# Patient Record
Sex: MALE | Race: WHITE | NOT HISPANIC OR LATINO | URBAN - METROPOLITAN AREA
[De-identification: names, ages, dates, MRNs, and addresses within clinical notes are randomized per-mention and may not be internally consistent; named-entity substitution may affect disease eponyms.]

---

## 2017-04-18 ENCOUNTER — OFFICE VISIT (OUTPATIENT)
Dept: OTOLARYNGOLOGY | Facility: CLINIC | Age: 57
End: 2017-04-18

## 2017-04-18 DIAGNOSIS — J38.5 LARYNGEAL SPASM: Primary | ICD-10-CM

## 2017-04-18 ASSESSMENT — PAIN SCALES - GENERAL: PAINLEVEL: NO PAIN (0)

## 2017-04-18 NOTE — MR AVS SNAPSHOT
After Visit Summary   4/18/2017    Rodrigo Gann    MRN: [id number]           Patient Information     Date Of Birth          1960        Visit Information        Provider Department      4/18/2017 11:25 AM Kameron Mcarthur MD;  ENT PROCEDURE ROOM [facility]        Today's Diagnoses     Laryngeal spasm    -  1      Care Instructions    Today you have had a  Botox injection  1. Do not expect any effect for about two days  2. You might have a period of breathiness for a few days up to a week. You might have no breathiness, everyone is different  3. When the Botox starts to wear off, call the clinic to schedule another appointment. ON THE AVERAGE, that is about every 3-4 months  4. Scheduling number is [phone], option 1  5. If the botox schedule is full, ask the  to talk to Mara, the nurse who works with Dr Mcarthur  6. Dr Love normal Botox Clinics are the first Thursday of every month, in the afternoon and the third Tuesday of the month, in the morning.  7. Please call the triage RN with questions/concerns: [phone] option 2.          Follow-ups after your visit        Follow-up notes from your care team     Return if symptoms worsen or fail to improve.      Who to contact     Please call your clinic at [phone] to:    Ask questions about your health    Make or cancel appointments    Discuss your medicines    Learn about your test results    Speak to your doctor   If you have compliments or concerns about an experience at your clinic, or if you wish to file a complaint, please contact [facility] Patient Relations at [phone] or email us at [email or web address]         Additional Information About Your Visit        CS Discohart Information     [employer]alogic is an electronic gateway that provides easy, online access to your medical records. With [employer]alog[employer], you can request a clinic appointment, read your test results, renew  a prescription or communicate with your care team.     To sign up for [employer] visit the website at [email or web address]   You will be asked to enter the access code listed below, as well as some personal information. Please follow the directions to create your username and password.     Your access code is: [email or web address]  Expires: 7/3/2017  6:30 AM     Your access code will  in 90 days. If you need help or a new code, please contact your [facility] or call [phone] for assistance.        Care EveryWhere ID     This is your Care EveryWhere ID. This could be used by other organizations to access your [place] medical records  [id number]         Blood Pressure from Last 3 Encounters:   No data found for BP    Weight from Last 3 Encounters:   No data found for Wt              We Performed the Following     HC CHEMODENERVATION MUSCLE LARYNX BILAT W/EMG        Primary Care Provider Office Phone # Fax #    Donita HUANG [facility] [phone]       [place] [address]        Thank you!     Thank you for choosing [facility] TH[facility]  for your care. Our goal is always to provide you with excellent care. Hearing back from our patients is one way we can continue to improve our services. Please take a few minutes to complete the written survey that you may receive in the mail after your visit with us. Thank you!             Your Updated Medication List - Protect others around you: Learn how to safely use, store and throw away your medicines at www.disposemymeds.org.          This list is accurate as of: 17 11:59 PM.  Always use your most recent med list.                   Brand Name Dispense Instructions for use    ASPIRIN LOW DOSE 81 MG tablet   Generic drug:  aspirin      Take by mouth daily       * BOTOX 100 UNITS injection   Generic drug:  botulinum toxin type A     1.5 each    Inject 1.5 units       * BOTOX 100  UNITS injection   Generic drug:  botulinum toxin type A     1.5 each    Inject 1.5 units       LAMICTAL PO      Take 100 mg by mouth daily       LOSARTAN POTASSIUM PO      Take 25 mg by mouth daily       MELATONIN PO          OMEGA-3 FISH OIL PO          omeprazole 20 MG tablet      Take 20 mg by mouth daily       SEROQUEL PO      Take 50 mg by mouth       simvastatin 20 MG tablet    ZOCOR     Take by mouth At Bedtime       * Notice:  This list has 2 medication(s) that are the same as other medications prescribed for you. Read the directions carefully, and ask your doctor or other care provider to review them with you.

## 2017-04-18 NOTE — PROGRESS NOTES
HISTORY OF PRESENT ILLNESS:  Mr. Gann is a 56-year-old gentleman with a history of laryngeal dystonia and laryngeal spasm.  He was last injected on August 23, 2016.  We did give him #0.75 units of botulinum A toxin bilaterally.  This worked quite well for him.  He notes that he stretched it out an extra few months, but still had five to six months of a very good quality of voice.  We will continue at #0.75 units of botulinum A toxin bilaterally today.         Last 2 Scores for Patient-Answered VHI Questionnaire  VHI Total Score 4/18/2017   VHI Total Score 18         PAST MEDICAL HISTORY:   Past Medical History:   Diagnosis Date     High cholesterol      Hypertension      Reflux        PAST SURGICAL HISTORY: No past surgical history on file.    FAMILY HISTORY:   Family History   Problem Relation Age of Onset     C.A.D. Father      Hypertension Father      Hypertension Sister      CANCER Mother      CANCER Sister        SOCIAL HISTORY:   Social History   Substance Use Topics     Smoking status: Never Smoker     Smokeless tobacco: Never Used     Alcohol use Yes      Comment: 2-3 drinks a week       REVIEW OF SYSTEMS: Ten point review of systems was performed and is negative except for: No flowsheet data found.     ALLERGIES: Review of patient's allergies indicates no known allergies.    MEDICATIONS:   Current Outpatient Prescriptions   Medication Sig Dispense Refill     botulinum toxin type A (BOTOX) 100 UNITS injection Inject 1.5 units 1.5 each      botulinum toxin type A (BOTOX) 100 UNITS injection Inject 1.5 units 1.5 each      LOSARTAN POTASSIUM PO Take 25 mg by mouth daily       MELATONIN PO        LamoTRIgine (LAMICTAL PO) Take 100 mg by mouth daily       QUEtiapine Fumarate (SEROQUEL PO) Take 50 mg by mouth       Omega-3 Fatty Acids (OMEGA-3 FISH OIL PO)        simvastatin (ZOCOR) 20 MG tablet Take by mouth At Bedtime       aspirin (ASPIRIN LOW DOSE) 81 MG tablet Take by mouth daily       omeprazole 20 MG  tablet Take 20 mg by mouth daily           PHYSICAL EXAMINATION:  He  is awake, alert and in no apparent distress.    His tympanic membranes are clear and intact bilaterally. External auditory canals are clear.  Nasal exam shows a mild septal deviation without obstruction.  Examination of the oral cavity shows no suspicious lesions.  There is symmetric movement of the tongue and soft palate.    The oropharynx is clear.  His neck is supple without significant adenopathy.  Pulse is regular.  Upper airway is clear.  Cranial nerves II-XII are grossly intact.          After obtaining consent, the patient was placed in the supine position. The anterior neck was cleaned with an alcohol swab. Using a 27 gauge, unipolar electromyography needle, the larynx was entered through the cricothyroid space. #0.75 units of botulinum A toxin was injected into each thyroarytenoid muscle. There was a Strong EMG response to phonation on the Left side and a Strong EMG response to phonation on the Right side.  A total of #1.5 units was given today.  An additional 5 units of botulinum A toxin was necessarily wasted in preparation for the injection.  The patient tolerated the procedure well and left the clinic after a short observation period.       The EMG was necessary specifically in this case to identify areas of muscle overactivity as well as to access the muscle which is beneath the thyroid cartilage and is not otherwise directly accessible   without EMG guidance.      cc: Donita Churchill NP           [facility]           [address]          [place]  [address]                     Netta Avila MD           [facility]          [address]          [place] [address]                    ERICKSON/lizzy

## 2017-04-18 NOTE — LETTER
4/18/2017       RE: Rodrigo Gann  [address] [address] [address]     Dear Colleague,    Thank you for referring your patient, Rodrigo Gann, to the [facility] E[facility] TH[facility] at [facility]. Please see a copy of my visit note below.    HISTORY OF PRESENT ILLNESS:  Mr. Gann is a 56-year-old gentleman with a history of laryngeal dystonia and laryngeal spasm.  He was last injected on August 23, 2016.  We did give him #0.75 units of botulinum A toxin bilaterally.  This worked quite well for him.  He notes that he stretched it out an extra few months, but still had five to six months of a very good quality of voice.  We will continue at #0.75 units of botulinum A toxin bilaterally today.         Last 2 Scores for Patient-Answered VHI Questionnaire  VHI Total Score 4/18/2017   VHI Total Score 18         PAST MEDICAL HISTORY:   Past Medical History:   Diagnosis Date     High cholesterol      Hypertension      Reflux        PAST SURGICAL HISTORY: No past surgical history on file.    FAMILY HISTORY:   Family History   Problem Relation Age of Onset     C.A.D. Father      Hypertension Father      Hypertension Sister      CANCER Mother      CANCER Sister        SOCIAL HISTORY:   Social History   Substance Use Topics     Smoking status: Never Smoker     Smokeless tobacco: Never Used     Alcohol use Yes      Comment: 2-3 drinks a week       REVIEW OF SYSTEMS: Ten point review of systems was performed and is negative except for: No flowsheet data found.     ALLERGIES: Review of patient's allergies indicates no known allergies.    MEDICATIONS:   Current Outpatient Prescriptions   Medication Sig Dispense Refill     botulinum toxin type A (BOTOX) 100 UNITS injection Inject 1.5 units 1.5 each      botulinum toxin type A (BOTOX) 100 UNITS injection Inject 1.5 units 1.5 each      LOSARTAN POTASSIUM PO Take 25 mg by mouth daily       MELATONIN PO        LamoTRIgine (LAMICTAL PO) Take  100 mg by mouth daily       QUEtiapine Fumarate (SEROQUEL PO) Take 50 mg by mouth       Omega-3 Fatty Acids (OMEGA-3 FISH OIL PO)        simvastatin (ZOCOR) 20 MG tablet Take by mouth At Bedtime       aspirin (ASPIRIN LOW DOSE) 81 MG tablet Take by mouth daily       omeprazole 20 MG tablet Take 20 mg by mouth daily           PHYSICAL EXAMINATION:  He  is awake, alert and in no apparent distress.    His tympanic membranes are clear and intact bilaterally. External auditory canals are clear.  Nasal exam shows a mild septal deviation without obstruction.  Examination of the oral cavity shows no suspicious lesions.  There is symmetric movement of the tongue and soft palate.    The oropharynx is clear.  His neck is supple without significant adenopathy.  Pulse is regular.  Upper airway is clear.  Cranial nerves II-XII are grossly intact.          After obtaining consent, the patient was placed in the supine position. The anterior neck was cleaned with an alcohol swab. Using a 27 gauge, unipolar electromyography needle, the larynx was entered through the cricothyroid space. #0.75 units of botulinum A toxin was injected into each thyroarytenoid muscle. There was a Strong EMG response to phonation on the Left side and a Strong EMG response to phonation on the Right side.  A total of #1.5 units was given today.  An additional 5 units of botulinum A toxin was necessarily wasted in preparation for the injection.  The patient tolerated the procedure well and left the clinic after a short observation period.       The EMG was necessary specifically in this case to identify areas of muscle overactivity as well as to access the muscle which is beneath the thyroid cartilage and is not otherwise directly accessible   without EMG guidance.       Again, thank you for allowing me to participate in the care of your patient.      Sincerely,    Kameron Mcarthur MD       cc: Donita Churchill NP           [facility]            [address]          [place]  [address]                     Netta Avila MD           [facility]          [address]          [place] [address]                    ERICKSON/lizzy

## 2017-04-18 NOTE — PATIENT INSTRUCTIONS
Today you have had a  Botox injection  1. Do not expect any effect for about two days  2. You might have a period of breathiness for a few days up to a week. You might have no breathiness, everyone is different  3. When the Botox starts to wear off, call the clinic to schedule another appointment. ON THE AVERAGE, that is about every 3-4 months  4. Scheduling number is [phone], option 1  5. If the botox schedule is full, ask the  to talk to Mara, the nurse who works with Dr Mcarthur  6. Dr Love normal Botox Clinics are the first Thursday of every month, in the afternoon and the third Tuesday of the month, in the morning.  7. Please call the triage RN with questions/concerns: [phone] option 2.

## 2017-04-18 NOTE — NURSING NOTE
Chief Complaint   Patient presents with     Minor Procedure     Botox     Pt signed consent.    LIVAN Medina LPN

## 2017-04-18 NOTE — NURSING NOTE
"[facility] TH[facility]  [address] [address]  Dept: [phone]  ______________________________________________________________________________    Patient: Rodrigo Gann   : 1960   MRN: [id number]   2017    INVASIVE PROCEDURE SAFETY CHECKLIST    Date: 2017   Procedure:Botox injection  Patient Name: Rodrigo Gann  MRN: [id number]  YOB: 1960    Action: Complete sections as appropriate. Any discrepancy results in a HARD COPY until resolved.     PRE PROCEDURE:  Patient ID verified with 2 identifiers (name and  or MRN): Yes  Procedure and site verified with patient/designee (when able): Yes  Accurate consent documentation in medical record: Yes  H&P (or appropriate assessment) documented in medical record: NA  H&P must be up to 20 days prior to procedure and updates within 24 hours of procedure as applicable: {NA YES NO, EXPLAIN:[id number]::\"no  Relevant diagnostic and radiology test results appropriately labeled and displayed as applicable: no  Procedure site(s) marked with provider initials: {NA YES NO, EXPLAIN:414970:no    TIMEOUT:  Time-Out performed immediately prior to starting procedure, including verbal and active participation of all team members addressing the following:Yes  * Correct patient identify  * Confirmed that the correct side and site are marked  * An accurate procedure consent form  * Agreement on the procedure to be done  * Correct patient position  * Relevant images and results are properly labeled and appropriately displayed  * The need to administer antibiotics or fluids for irrigation purposes during the procedure as applicable   * Safety precautions based on patient history or medication use    DURING PROCEDURE: Verification of correct person, site, and procedures any time the responsibility for care of the patient is transferred to another member of the care team.               "

## 2017-07-17 DIAGNOSIS — J38.7 LARYNX DISORDER: Primary | ICD-10-CM

## 2017-07-18 ENCOUNTER — OFFICE VISIT (OUTPATIENT)
Dept: OTOLARYNGOLOGY | Facility: CLINIC | Age: 57
End: 2017-07-18

## 2017-07-18 DIAGNOSIS — J38.5 LARYNGEAL SPASM: Primary | ICD-10-CM

## 2017-07-18 ASSESSMENT — PAIN SCALES - GENERAL: PAINLEVEL: NO PAIN (0)

## 2017-07-18 NOTE — PATIENT INSTRUCTIONS
"[facility] EAR NOSE AND THROAT  [address] [address]  Dept: [phone]  ______________________________________________________________________________    Patient: Rodrigo Gann   : 1960   MRN: [id number]   2017    INVASIVE PROCEDURE SAFETY CHECKLIST    Date: 2017   Procedure:Botox injection  Patient Name: Rodrigo Gann  MRN: [id number]  YOB: 1960    Action: Complete sections as appropriate. Any discrepancy results in a HARD COPY until resolved.     PRE PROCEDURE:  Patient ID verified with 2 identifiers (name and  or MRN): Yes  Procedure and site verified with patient/designee (when able): Yes  Accurate consent documentation in medical record: Yes  H&P (or appropriate assessment) documented in medical record: NA  H&P must be up to 20 days prior to procedure and updates within 24 hours of procedure as applicable: {NA YES NO, EXPLAIN:[id number]::\"no  Relevant diagnostic and radiology test results appropriately labeled and displayed as applicable: no  Procedure site(s) marked with provider initials: {NA YES NO, EXPLAIN:627984:no    TIMEOUT:  Time-Out performed immediately prior to starting procedure, including verbal and active participation of all team members addressing the following:Yes  * Correct patient identify  * Confirmed that the correct side and site are marked  * An accurate procedure consent form  * Agreement on the procedure to be done  * Correct patient position  * Relevant images and results are properly labeled and appropriately displayed  * The need to administer antibiotics or fluids for irrigation purposes during the procedure as applicable   * Safety precautions based on patient history or medication use    DURING PROCEDURE: Verification of correct person, site, and procedures any time the responsibility for care of the patient is transferred to another member of the care team.               "

## 2017-07-18 NOTE — NURSING NOTE
Chief Complaint   Patient presents with     Minor Procedure     Botox     Pt states no pain today.  Pt signed consent.    LIVAN Medina LPN

## 2017-07-18 NOTE — PROGRESS NOTES
Rodrigo Gann is a 57 year old male with a history of adductor laryngeal dystonia and laryngeal spasm. he was last injected on 4/18/2017. he had a good response to therapy. The last dose given was 0.75 units into each thyroarytenoid muscle.  After the injection he had a breathy dysphonia for 1 week. There was  neither Dysphagia nor Dyspnea. And the response lasted for 3 months. Our plan is to give 0.75 units of Botulinum A toxin into each thyroarytenoid muscle today.      PAST MEDICAL HISTORY:   Past Medical History:   Diagnosis Date     High cholesterol      Hypertension      Reflux        PAST SURGICAL HISTORY: No past surgical history on file.    FAMILY HISTORY:   Family History   Problem Relation Age of Onset     C.A.D. Father      Hypertension Father      Hypertension Sister      CANCER Mother      CANCER Sister        SOCIAL HISTORY:   Social History   Substance Use Topics     Smoking status: Never Smoker     Smokeless tobacco: Never Used     Alcohol use Yes      Comment: 2-3 drinks a week       REVIEW OF SYSTEMS: Ten point review of systems was performed and is negative except for what is noted in the HPI and PMH.     ALLERGIES: Review of patient's allergies indicates no known allergies.    MEDICATIONS:   Current Outpatient Prescriptions   Medication Sig Dispense Refill     botulinum toxin type A (BOTOX) 100 UNITS injection Inject 1.5 units 1.5 each      botulinum toxin type A (BOTOX) 100 UNITS injection Inject 1.5 units 1.5 each      botulinum toxin type A (BOTOX) 100 UNITS injection Inject 1.5 units 1.5 each      LOSARTAN POTASSIUM PO Take 25 mg by mouth daily       MELATONIN PO        LamoTRIgine (LAMICTAL PO) Take 100 mg by mouth daily       QUEtiapine Fumarate (SEROQUEL PO) Take 50 mg by mouth       Omega-3 Fatty Acids (OMEGA-3 FISH OIL PO)        simvastatin (ZOCOR) 20 MG tablet Take by mouth At Bedtime       aspirin (ASPIRIN LOW DOSE) 81 MG tablet Take by mouth daily       omeprazole 20 MG tablet Take  20 mg by mouth daily         PHYSICAL EXAMINATION:  He  is awake, alert and in no apparent distress.    His tympanic membranes are clear and intact bilaterally. External auditory canals are clear.  Nasal exam shows a mild septal deviation without obstruction.  Examination of the oral cavity shows no suspicious lesions.  There is symmetric movement of the tongue and soft palate.    The oropharynx is clear.  His neck is supple without significant adenopathy.  Pulse is regular.  Upper airway is clear.  Cranial nerves II-XII are grossly intact.             PROCEDURE: After obtaining consent, the patient was placed in the supine position. Ground and reference electrodes were applied. The anterior neck was cleaned with an alcohol swab.  Using a 27-gauge, unipolar electromyography needle, the larynx was entered through the cricothyroid space.  0.75 units of botulinum A toxin was injected into each thyroarytenoid muscle.  There was a Moderate EMG response to phonation on the left side and a Moderate EMG response to phonation on the right side.  The total amount of botulinum A toxin delivered today was 1.5 units. An additional 5 units of botulinum A toxin was necessarily wasted in preparation for the injection. he tolerated the procedure well and left the clinic after a short observation period.       The EMG was necessary specifically in this case to identify areas of muscle overactivity as well as to access the muscle which is beneath the thyroid cartilage and is not otherwise directly accessible without EMG guidance.    PLAN: I will have him follow up on a PRN basis.    cc:                  Donita Churchill NP           [facility]           [address]          [place]  [address]               Netta Avila MD           [facility]          [address]          [place] [address]

## 2017-07-18 NOTE — MR AVS SNAPSHOT
"              After Visit Summary   2017    Rodrigo Gann    MRN: [id number]           Patient Information     Date Of Birth          1960        Visit Information        Provider Department      2017 10:05 AM Kameron Mcarthur MD [facility]        Today's Diagnoses     Laryngeal spasm    -  1      Care Instructions    [facility]  [address] [address]  Dept: [phone]  ______________________________________________________________________________    Patient: Rodrigo Gann   : 1960   MRN: [id number]   2017    INVASIVE PROCEDURE SAFETY CHECKLIST    Date: 2017   Procedure:Botox injection  Patient Name: Rodrigo Gann  MRN: [id number]  YOB: 1960    Action: Complete sections as appropriate. Any discrepancy results in a HARD COPY until resolved.     PRE PROCEDURE:  Patient ID verified with 2 identifiers (name and  or MRN): Yes  Procedure and site verified with patient/designee (when able): Yes  Accurate consent documentation in medical record: Yes  H&P (or appropriate assessment) documented in medical record: NA  H&P must be up to 20 days prior to procedure and updates within 24 hours of procedure as applicable: {NA YES NO, EXPLAIN:826453::\"no  Relevant diagnostic and radiology test results appropriately labeled and displayed as applicable: no  Procedure site(s) marked with provider initials: {NA YES NO, EXPLAIN:626620:no    TIMEOUT:  Time-Out performed immediately prior to starting procedure, including verbal and active participation of all team members addressing the following:Yes  * Correct patient identify  * Confirmed that the correct side and site are marked  * An accurate procedure consent form  * Agreement on the procedure to be done  * Correct patient position  * Relevant images and results are properly labeled and appropriately displayed  * The need to administer antibiotics or " fluids for irrigation purposes during the procedure as applicable   * Safety precautions based on patient history or medication use    DURING PROCEDURE: Verification of correct person, site, and procedures any time the responsibility for care of the patient is transferred to another member of the care team.                       Follow-ups after your visit        Follow-up notes from your care team     Return if symptoms worsen or fail to improve.      Who to contact     Please call your clinic at [phone] to:    Ask questions about your health    Make or cancel appointments    Discuss your medicines    Learn about your test results    Speak to your doctor   If you have compliments or concerns about an experience at your clinic, or if you wish to file a complaint, please contact [facility] Patient Relations at [phone] or email us at [email or web address]         Additional Information About Your Visit        [employer] Information     [employer] is an electronic gateway that provides easy, online access to your medical records. With [employer], you can request a clinic appointment, read your test results, renew a prescription or communicate with your care team.     To sign up for [employer] visit the website at [email or web address]   You will be asked to enter the access code listed below, as well as some personal information. Please follow the directions to create your username and password.     Your access code is: [email or web address]  Expires: 10/15/2017  6:31 AM     Your access code will  in 90 days. If you need help or a new code, please contact your [facility] or call [phone] for assistance.        Care EveryWhere ID     This is your Care EveryWhere ID. This could be used by other organizations to access your [facility] medical records  [id number]         Blood Pressure from Last 3 Encounters:   No data found for BP    Weight from Last  3 Encounters:   No data found for Wt              We Performed the Following     HC CHEMODENERVATION MUSCLE LARYNX BILAT W/EMG        Primary Care Provider Office Phone # Fax #    Donita Elizabethund [phone]       [facility] [address]  [facility] [address]        Equal Access to Services     SAROJ MAGDALENO : Hadreed mcfadden hadasho Soomaali, waaxda luqadaha, qaybta kaalmada adeegyada, waxay dinorah haygiulianan brandon johnsdarifransisca cullen. So [facility] [phone].    ATENCIÓN: Si habla español, tiene a aguirre disposición servicios gratuitos de asistencia lingüística. Llame al [phone].    We comply with applicable federal civil rights laws and [place] laws. We do not discriminate on the basis of race, color, national origin, age, disability sex, sexual orientation or gender identity.            Thank you!     Thank you for choosing [facility]  for your care. Our goal is always to provide you with excellent care. Hearing back from our patients is one way we can continue to improve our services. Please take a few minutes to complete the written survey that you may receive in the mail after your visit with us. Thank you!             Your Updated Medication List - Protect others around you: Learn how to safely use, store and throw away your medicines at www.disposemymeds.org.          This list is accurate as of: 7/18/17 10:34 AM.  Always use your most recent med list.                   Brand Name Dispense Instructions for use Diagnosis    ASPIRIN LOW DOSE 81 MG tablet   Generic drug:  aspirin      Take by mouth daily        * BOTOX 100 UNITS injection   Generic drug:  botulinum toxin type A     1.5 each    Inject 1.5 units    Larynx disorder       * BOTOX 100 UNITS injection   Generic drug:  botulinum toxin type A     1.5 each    Inject 1.5 units    Laryngeal spasm       * BOTOX 100 UNITS injection   Generic drug:  botulinum toxin type A     1.5 each    Inject 1.5 units    Larynx  disorder       LAMICTAL PO      Take 100 mg by mouth daily        LOSARTAN POTASSIUM PO      Take 25 mg by mouth daily        MELATONIN PO           OMEGA-3 FISH OIL PO           omeprazole 20 MG tablet      Take 20 mg by mouth daily        SEROQUEL PO      Take 50 mg by mouth        simvastatin 20 MG tablet    ZOCOR     Take by mouth At Bedtime        * Notice:  This list has 3 medication(s) that are the same as other medications prescribed for you. Read the directions carefully, and ask your doctor or other care provider to review them with you.

## 2017-07-18 NOTE — LETTER
7/18/2017       RE: Rodrigo Gann  [address] [address] [address]     Dear Colleague,    Thank you for referring your patient, Rodrigo Gann, to the [facility] at [facility]. Please see a copy of my visit note below.      Rodrigo Gann is a 57 year old male with a history of adductor laryngeal dystonia and laryngeal spasm. he was last injected on 4/18/2017. he had a good response to therapy. The last dose given was 0.75 units into each thyroarytenoid muscle.  After the injection he had a breathy dysphonia for 1 week. There was  neither Dysphagia nor Dyspnea. And the response lasted for 3 months. Our plan is to give 0.75 units of Botulinum A toxin into each thyroarytenoid muscle today.      PAST MEDICAL HISTORY:   Past Medical History:   Diagnosis Date     High cholesterol      Hypertension      Reflux        PAST SURGICAL HISTORY: No past surgical history on file.    FAMILY HISTORY:   Family History   Problem Relation Age of Onset     C.A.D. Father      Hypertension Father      Hypertension Sister      CANCER Mother      CANCER Sister        SOCIAL HISTORY:   Social History   Substance Use Topics     Smoking status: Never Smoker     Smokeless tobacco: Never Used     Alcohol use Yes      Comment: 2-3 drinks a week       REVIEW OF SYSTEMS: Ten point review of systems was performed and is negative except for what is noted in the HPI and PMH.     ALLERGIES: Review of patient's allergies indicates no known allergies.    MEDICATIONS:   Current Outpatient Prescriptions   Medication Sig Dispense Refill     botulinum toxin type A (BOTOX) 100 UNITS injection Inject 1.5 units 1.5 each      botulinum toxin type A (BOTOX) 100 UNITS injection Inject 1.5 units 1.5 each      botulinum toxin type A (BOTOX) 100 UNITS injection Inject 1.5 units 1.5 each      LOSARTAN POTASSIUM PO Take 25 mg by mouth daily       MELATONIN PO        LamoTRIgine (LAMICTAL PO) Take 100 mg by  mouth daily       QUEtiapine Fumarate (SEROQUEL PO) Take 50 mg by mouth       Omega-3 Fatty Acids (OMEGA-3 FISH OIL PO)        simvastatin (ZOCOR) 20 MG tablet Take by mouth At Bedtime       aspirin (ASPIRIN LOW DOSE) 81 MG tablet Take by mouth daily       omeprazole 20 MG tablet Take 20 mg by mouth daily         PHYSICAL EXAMINATION:  He  is awake, alert and in no apparent distress.    His tympanic membranes are clear and intact bilaterally. External auditory canals are clear.  Nasal exam shows a mild septal deviation without obstruction.  Examination of the oral cavity shows no suspicious lesions.  There is symmetric movement of the tongue and soft palate.    The oropharynx is clear.  His neck is supple without significant adenopathy.  Pulse is regular.  Upper airway is clear.  Cranial nerves II-XII are grossly intact.             PROCEDURE: After obtaining consent, the patient was placed in the supine position. Ground and reference electrodes were applied. The anterior neck was cleaned with an alcohol swab.  Using a 27-gauge, unipolar electromyography needle, the larynx was entered through the cricothyroid space.  0.75 units of botulinum A toxin was injected into each thyroarytenoid muscle.  There was a Moderate EMG response to phonation on the left side and a Moderate EMG response to phonation on the right side.  The total amount of botulinum A toxin delivered today was 1.5 units. An additional 5 units of botulinum A toxin was necessarily wasted in preparation for the injection. he tolerated the procedure well and left the clinic after a short observation period.       The EMG was necessary specifically in this case to identify areas of muscle overactivity as well as to access the muscle which is beneath the thyroid cartilage and is not otherwise directly accessible without EMG guidance.    PLAN: I will have him follow up on a PRN basis.      Again, thank you for allowing me to participate in the care of your  patient.      Sincerely,    Kameron Mcarthur MD        cc:   Donita Churchill NP           [facility]           [address], [address]          [place]  [address]               Netta Avila MD           [facility]          [address]          [place] [address]

## 2017-11-02 ENCOUNTER — OFFICE VISIT (OUTPATIENT)
Dept: OTOLARYNGOLOGY | Facility: CLINIC | Age: 57
End: 2017-11-02

## 2017-11-02 DIAGNOSIS — J38.5 LARYNGEAL SPASM: Primary | ICD-10-CM

## 2017-11-02 ASSESSMENT — PAIN SCALES - GENERAL: PAINLEVEL: NO PAIN (0)

## 2017-11-02 NOTE — PROGRESS NOTES
Rodrigo Gann is a 57 year old male with a history of adductor laryngeal dystonia and laryngeal spasm.  he was last injected on 7/18/2017. he had a good response to the last injection. The last dose given was 0.75 units into each thyroarytenoid muscle.  After the injection  he had a breathy dysphonia for 0 weeks.There was no Dysphagia or Dyspnea.  The response lasted for 4 months.   Our plan is to give 0.75 units of Botulinum A toxin into  each thyroarytenoid muscle today.      PAST MEDICAL HISTORY:   Past Medical History:   Diagnosis Date     High cholesterol      Hypertension      Reflux        PAST SURGICAL HISTORY: No past surgical history on file.    FAMILY HISTORY:   Family History   Problem Relation Age of Onset     C.A.D. Father      Hypertension Father      Hypertension Sister      CANCER Mother      CANCER Sister        SOCIAL HISTORY:   Social History   Substance Use Topics     Smoking status: Never Smoker     Smokeless tobacco: Never Used     Alcohol use Yes      Comment: 2-3 drinks a week       REVIEW OF SYSTEMS: Ten point review of systems was performed and is negative except for what is noted in the HPI and PMH.     ALLERGIES: Review of patient's allergies indicates no known allergies.    MEDICATIONS:   Current Outpatient Prescriptions   Medication Sig Dispense Refill     botulinum toxin type A (BOTOX) 100 UNITS injection Inject 1.5 units 1.5 each      botulinum toxin type A (BOTOX) 100 UNITS injection Inject 1.5 units 1.5 each      botulinum toxin type A (BOTOX) 100 UNITS injection Inject 1.5 units 1.5 each      LOSARTAN POTASSIUM PO Take 25 mg by mouth daily       MELATONIN PO        LamoTRIgine (LAMICTAL PO) Take 100 mg by mouth daily       QUEtiapine Fumarate (SEROQUEL PO) Take 50 mg by mouth       Omega-3 Fatty Acids (OMEGA-3 FISH OIL PO)        simvastatin (ZOCOR) 20 MG tablet Take by mouth At Bedtime       aspirin (ASPIRIN LOW DOSE) 81 MG tablet Take by mouth daily       omeprazole 20 MG  tablet Take 20 mg by mouth daily         PHYSICAL EXAMINATION:  He  is awake, alert and in no apparent distress.    His tympanic membranes are clear and intact bilaterally. External auditory canals are clear.  Nasal exam shows a mild septal deviation without obstruction.  Examination of the oral cavity shows no suspicious lesions.  There is symmetric movement of the tongue and soft palate.    The oropharynx is clear.  His neck is supple without significant adenopathy.  Pulse is regular.  Upper airway is clear.  Cranial nerves II-XII are grossly intact.         PROCEDURE: After obtaining consent, the patient was placed in the supine position. Ground and reference electrodes were applied. The anterior neck was cleaned with an alcohol swab.  Using a 27-gauge, unipolar electromyography needle, the larynx was entered through the cricothyroid space.  0.75 units of botulinum A toxin was injected into each thyroarytenoid muscle.  There was a Strong EMG response to phonation on the left side and a Strong EMG response to phonation on the right side.  The total amount of botulinum A toxin delivered today was 1.5 units. An additional 5 units of botulinum A toxin was necessarily wasted in preparation for the injection. he tolerated the procedure well and left the clinic after a short observation period.         The EMG was necessary specifically in this case to identify areas of muscle overactivity as well as to access the thyroarytenoid muscle which is beneath the thyroid cartilage and is not otherwise directly accessible without EMG guidance.    PLAN: I will have him  follow up on a PRN basis.

## 2017-11-02 NOTE — MR AVS SNAPSHOT
After Visit Summary   11/2/2017    Rodrigo Gann    MRN: [id number]           Patient Information     Date Of Birth          1960        Visit Information        Provider Department      11/2/2017 3:00 PM Kameron Mcarthur MD [facility]        Today's Diagnoses     Laryngeal spasm    -  1      Care Instructions    Today you have had a  Botox injection  1. Do not expect any effect for about two days  2. You might have a period of breathiness for a few days up to a week. You might have no breathiness, everyone is different  3. When the Botox starts to wear off, call the clinic to schedule another appointment. ON THE AVERAGE, that is about every 3-4 months  4. Scheduling number is [phone], option 1  5. If the botox schedule is full, ask the  to talk to Mara, the nurse who works with Dr Mcarthur  6. Dr Love normal Botox Clinics are the first Thursday of every month, in the afternoon and the third Tuesday of the month, in the morning.  7. Please call the triage RN with questions/concerns: [phone] option 2.            Follow-ups after your visit        Follow-up notes from your care team     Return if symptoms worsen or fail to improve.      Who to contact     Please call your clinic at [phone] to:    Ask questions about your health    Make or cancel appointments    Discuss your medicines    Learn about your test results    Speak to your doctor   If you have compliments or concerns about an experience at your clinic, or if you wish to file a complaint, please contact [facility] Patient Relations at [phone] or email us at [email or web address]         Additional Information About Your Visit        MyChart Information     [employer] is an electronic gateway that provides easy, online access to your medical records. With [employer], you can request a clinic appointment, read your test results, renew a prescription or  communicate with your care team.     To sign up for [employer] visit the website at [email or web address]   You will be asked to enter the access code listed below, as well as some personal information. Please follow the directions to create your username and password.     Your access code is: [email or web address]  Expires: 2018  6:30 AM     Your access code will  in 90 days. If you need help or a new code, please contact your [facility] or call [phone] for assistance.        Care EveryWhere ID     This is your Care EveryWhere ID. This could be used by other organizations to access your [facility] medical records  [id number]         Blood Pressure from Last 3 Encounters:   No data found for BP    Weight from Last 3 Encounters:   No data found for Wt              We Performed the Following     HC CHEMODENERVATION MUSCLE LARYNX BILAT W/EMG        Primary Care Provider Office Phone # Fax #    Donita HUANG [facility] [phone]       [address] [place] [address]        Equal Access to Services     [facility]: Hadii aad ku hadasho Soomaali, waaxda luqadaha, qaybta kaalmada adeegyada, waxay dinorah haycandice jordan . So [facility] [phone].    ATENCIÓN: Si habla español, tiene a aguirre disposición servicios gratuitos de asistencia lingüística. Llame al [phone].    We comply with applicable federal civil rights laws and [place] laws. We do not discriminate on the basis of race, color, national origin, age, disability, sex, sexual orientation, or gender identity.            Thank you!     Thank you for choosing [facility] TH[facility]AT  for your care. Our goal is always to provide you with excellent care. Hearing back from our patients is one way we can continue to improve our services. Please take a few minutes to complete the written survey that you may receive in the mail after your visit with us. Thank you!             Your  Updated Medication List - Protect others around you: Learn how to safely use, store and throw away your medicines at www.disposemymeds.org.          This list is accurate as of: 11/2/17 11:59 PM.  Always use your most recent med list.                   Brand Name Dispense Instructions for use Diagnosis    ASPIRIN LOW DOSE 81 MG tablet   Generic drug:  aspirin      Take by mouth daily        * BOTOX 100 UNITS injection   Generic drug:  botulinum toxin type A     1.5 each    Inject 1.5 units    Larynx disorder       * BOTOX 100 UNITS injection   Generic drug:  botulinum toxin type A     1.5 each    Inject 1.5 units    Laryngeal spasm       * BOTOX 100 UNITS injection   Generic drug:  botulinum toxin type A     1.5 each    Inject 1.5 units    Larynx disorder       LAMICTAL PO      Take 100 mg by mouth daily        LOSARTAN POTASSIUM PO      Take 25 mg by mouth daily        MELATONIN PO           OMEGA-3 FISH OIL PO           omeprazole 20 MG tablet      Take 20 mg by mouth daily        SEROQUEL PO      Take 50 mg by mouth        simvastatin 20 MG tablet    ZOCOR     Take by mouth At Bedtime        * Notice:  This list has 3 medication(s) that are the same as other medications prescribed for you. Read the directions carefully, and ask your doctor or other care provider to review them with you.

## 2017-11-02 NOTE — PATIENT INSTRUCTIONS
Today you have had a  Botox injection  1. Do not expect any effect for about two days  2. You might have a period of breathiness for a few days up to a week. You might have no breathiness, everyone is different  3. When the Botox starts to wear off, call the clinic to schedule another appointment. ON THE AVERAGE, that is about every 3-4 months  4. Scheduling number is [phone], option 1  5. If the botox schedule is full, ask the  to talk to Mara, the nurse who works with Dr Mcarthur  6. Dr Love normal Botox Clinics are the first Thursday of every month, in the afternoon and the third Tuesday of the month, in the morning.  7. Please call the triage RN with questions/concerns: [phone] option 2.

## 2017-11-02 NOTE — LETTER
11/2/2017       RE: Rodrigo Gann  [address] [facility] [address]     Dear Colleague,    Thank you for referring your patient, Rodrigo Gann, to the [facility] EAR NOSE AND THROAT at [facility]. Please see a copy of my visit note below.        Rodrigo Gann is a 57 year old male with a history of adductor laryngeal dystonia and laryngeal spasm.  he was last injected on 7/18/2017. he had a good response to the last injection. The last dose given was 0.75 units into each thyroarytenoid muscle.  After the injection  he had a breathy dysphonia for 0 weeks.There was no Dysphagia or Dyspnea.  The response lasted for 4 months.   Our plan is to give 0.75 units of Botulinum A toxin into  each thyroarytenoid muscle today.      PAST MEDICAL HISTORY:   Past Medical History:   Diagnosis Date     High cholesterol      Hypertension      Reflux        PAST SURGICAL HISTORY: No past surgical history on file.    FAMILY HISTORY:   Family History   Problem Relation Age of Onset     C.A.D. Father      Hypertension Father      Hypertension Sister      CANCER Mother      CANCER Sister        SOCIAL HISTORY:   Social History   Substance Use Topics     Smoking status: Never Smoker     Smokeless tobacco: Never Used     Alcohol use Yes      Comment: 2-3 drinks a week       REVIEW OF SYSTEMS: Ten point review of systems was performed and is negative except for what is noted in the HPI and PMH.     ALLERGIES: Review of patient's allergies indicates no known allergies.    MEDICATIONS:   Current Outpatient Prescriptions   Medication Sig Dispense Refill     botulinum toxin type A (BOTOX) 100 UNITS injection Inject 1.5 units 1.5 each      botulinum toxin type A (BOTOX) 100 UNITS injection Inject 1.5 units 1.5 each      botulinum toxin type A (BOTOX) 100 UNITS injection Inject 1.5 units 1.5 each      LOSARTAN POTASSIUM PO Take 25 mg by mouth daily       MELATONIN PO        LamoTRIgine (LAMICTAL PO) Take 100  mg by mouth daily       QUEtiapine Fumarate (SEROQUEL PO) Take 50 mg by mouth       Omega-3 Fatty Acids (OMEGA-3 FISH OIL PO)        simvastatin (ZOCOR) 20 MG tablet Take by mouth At Bedtime       aspirin (ASPIRIN LOW DOSE) 81 MG tablet Take by mouth daily       omeprazole 20 MG tablet Take 20 mg by mouth daily         PHYSICAL EXAMINATION:  He  is awake, alert and in no apparent distress.    His tympanic membranes are clear and intact bilaterally. External auditory canals are clear.  Nasal exam shows a mild septal deviation without obstruction.  Examination of the oral cavity shows no suspicious lesions.  There is symmetric movement of the tongue and soft palate.    The oropharynx is clear.  His neck is supple without significant adenopathy.  Pulse is regular.  Upper airway is clear.  Cranial nerves II-XII are grossly intact.         PROCEDURE: After obtaining consent, the patient was placed in the supine position. Ground and reference electrodes were applied. The anterior neck was cleaned with an alcohol swab.  Using a 27-gauge, unipolar electromyography needle, the larynx was entered through the cricothyroid space.  0.75 units of botulinum A toxin was injected into each thyroarytenoid muscle.  There was a Strong EMG response to phonation on the left side and a Strong EMG response to phonation on the right side.  The total amount of botulinum A toxin delivered today was 1.5 units. An additional 5 units of botulinum A toxin was necessarily wasted in preparation for the injection. he tolerated the procedure well and left the clinic after a short observation period.         The EMG was necessary specifically in this case to identify areas of muscle overactivity as well as to access the thyroarytenoid muscle which is beneath the thyroid cartilage and is not otherwise directly accessible without EMG guidance.    PLAN: I will have him  follow up on a PRN basis.        =  Again, thank you for allowing me to participate  in the care of your patient.      Sincerely,    Kameron Mcarthur MD

## 2017-11-02 NOTE — NURSING NOTE
"[facility] EAR NOSE AND THROAT  [address] [address]  Dept: [phone]  ______________________________________________________________________________    Patient: Rodrigo Gann   : 1960   MRN: [id number]   2017    INVASIVE PROCEDURE SAFETY CHECKLIST    Date: 2017   Procedure:Botox injection  Patient Name: Rodrigo Gann  MRN: [id number]  YOB: 1960    Action: Complete sections as appropriate. Any discrepancy results in a HARD COPY until resolved.     PRE PROCEDURE:  Patient ID verified with 2 identifiers (name and  or MRN): Yes  Procedure and site verified with patient/designee (when able): Yes  Accurate consent documentation in medical record: Yes  H&P (or appropriate assessment) documented in medical record: NA  H&P must be up to 20 days prior to procedure and updates within 24 hours of procedure as applicable: {NA YES NO, EXPLAIN:036897::\"no  Relevant diagnostic and radiology test results appropriately labeled and displayed as applicable: no  Procedure site(s) marked with provider initials: {NA YES NO, EXPLAIN:[id number]:no    TIMEOUT:  Time-Out performed immediately prior to starting procedure, including verbal and active participation of all team members addressing the following:Yes  * Correct patient identify  * Confirmed that the correct side and site are marked  * An accurate procedure consent form  * Agreement on the procedure to be done  * Correct patient position  * Relevant images and results are properly labeled and appropriately displayed  * The need to administer antibiotics or fluids for irrigation purposes during the procedure as applicable   * Safety precautions based on patient history or medication use    DURING PROCEDURE: Verification of correct person, site, and procedures any time the responsibility for care of the patient is transferred to another member of the care team.               "

## 2018-02-20 ENCOUNTER — OFFICE VISIT (OUTPATIENT)
Dept: OTOLARYNGOLOGY | Facility: CLINIC | Age: 58
End: 2018-02-20
Payer: COMMERCIAL

## 2018-02-20 DIAGNOSIS — J38.5 LARYNGEAL SPASM: Primary | ICD-10-CM

## 2018-02-20 RX ORDER — UBIDECARENONE 100 MG
200 CAPSULE ORAL DAILY
COMMUNITY

## 2018-02-20 RX ORDER — ACETAMINOPHEN 160 MG
1 TABLET,DISINTEGRATING ORAL DAILY
COMMUNITY

## 2018-02-20 ASSESSMENT — PAIN SCALES - GENERAL: PAINLEVEL: NO PAIN (0)

## 2018-02-20 NOTE — PROGRESS NOTES
Rodrigo Gann is a 57 year old male with a history of adductor laryngeal dystonia and laryngeal spasm.  he was last injected on 11/2/2017. he had a good response to the last injection. The last dose given was 0.75 units into each thyroarytenoid muscle.  After the injection  he had a breathy dysphonia for 0 weeks.There was no Dysphagia or Dyspnea.  The response lasted for 3 months.   Our plan is to give 0.75 units of Botulinum A toxin into  each thyroarytenoid muscle today.        PAST MEDICAL HISTORY:   Past Medical History:   Diagnosis Date     High cholesterol      Hypertension      Reflux        PAST SURGICAL HISTORY: No past surgical history on file.    FAMILY HISTORY:   Family History   Problem Relation Age of Onset     C.A.D. Father      Hypertension Father      Hypertension Sister      CANCER Mother      CANCER Sister        SOCIAL HISTORY:   Social History   Substance Use Topics     Smoking status: Never Smoker     Smokeless tobacco: Never Used     Alcohol use Yes      Comment: 2-3 drinks a week       REVIEW OF SYSTEMS: Ten point review of systems was performed and is negative except for what is noted in the HPI and PMH.     ALLERGIES: Review of patient's allergies indicates no known allergies.    MEDICATIONS:   Current Outpatient Prescriptions   Medication Sig Dispense Refill     botulinum toxin type A (BOTOX) 100 UNITS injection Inject 1.5 units 1.5 each      botulinum toxin type A (BOTOX) 100 UNITS injection Inject 1.5 units 1.5 each      botulinum toxin type A (BOTOX) 100 UNITS injection Inject 1.5 units 1.5 each      LOSARTAN POTASSIUM PO Take 25 mg by mouth daily       MELATONIN PO        LamoTRIgine (LAMICTAL PO) Take 100 mg by mouth daily       QUEtiapine Fumarate (SEROQUEL PO) Take 50 mg by mouth       Omega-3 Fatty Acids (OMEGA-3 FISH OIL PO)        simvastatin (ZOCOR) 20 MG tablet Take by mouth At Bedtime       aspirin (ASPIRIN LOW DOSE) 81 MG tablet Take by mouth daily       omeprazole 20 MG  tablet Take 20 mg by mouth daily         PHYSICAL EXAMINATION:  He  is awake, alert and in no apparent distress.    His tympanic membranes are clear and intact bilaterally. External auditory canals are clear.  Nasal exam shows a mild septal deviation without obstruction.  Examination of the oral cavity shows no suspicious lesions.  There is symmetric movement of the tongue and soft palate.    The oropharynx is clear.  His neck is supple without significant adenopathy.  Pulse is regular.  Upper airway is clear.  Cranial nerves II-XII are grossly intact.         PROCEDURE: After obtaining consent, the patient was placed in the supine position. Ground and reference electrodes were applied. The anterior neck was cleaned with an alcohol swab.  Using a 27-gauge, unipolar electromyography needle, the larynx was entered through the cricothyroid space.  0.75 units of botulinum A toxin was injected into each thyroarytenoid muscle.  There was a Strong EMG response to phonation on the left side and a Strong EMG response to phonation on the right side.  The total amount of botulinum A toxin delivered today was 1.5 units. An additional 5 units of botulinum A toxin was necessarily wasted in preparation for the injection. he tolerated the procedure well and left the clinic after a short observation period.         The EMG was necessary specifically in this case to identify areas of muscle overactivity as well as to access the thyroarytenoid muscle which is beneath the thyroid cartilage and is not otherwise directly accessible without EMG guidance.    PLAN: I will have him  follow up on a PRN basis.

## 2018-02-20 NOTE — LETTER
2/20/2018       RE: Rodrigo Gann  [address] [facility] [address]     Dear Colleague,    Thank you for referring your patient, Rodrigo Gann, to the [facility] EAR NOSE AND THROAT at [facility]. Please see a copy of my visit note below.      Rodrigo Gann is a 57 year old male with a history of adductor laryngeal dystonia and laryngeal spasm.  he was last injected on 11/2/2017. he had a good response to the last injection. The last dose given was 0.75 units into each thyroarytenoid muscle.  After the injection  he had a breathy dysphonia for 0 weeks.There was no Dysphagia or Dyspnea.  The response lasted for 3 months.   Our plan is to give 0.75 units of Botulinum A toxin into  each thyroarytenoid muscle today.        PAST MEDICAL HISTORY:   Past Medical History:   Diagnosis Date     High cholesterol      Hypertension      Reflux        PAST SURGICAL HISTORY: No past surgical history on file.    FAMILY HISTORY:   Family History   Problem Relation Age of Onset     C.A.D. Father      Hypertension Father      Hypertension Sister      CANCER Mother      CANCER Sister        SOCIAL HISTORY:   Social History   Substance Use Topics     Smoking status: Never Smoker     Smokeless tobacco: Never Used     Alcohol use Yes      Comment: 2-3 drinks a week       REVIEW OF SYSTEMS: Ten point review of systems was performed and is negative except for what is noted in the HPI and PMH.     ALLERGIES: Review of patient's allergies indicates no known allergies.    MEDICATIONS:   Current Outpatient Prescriptions   Medication Sig Dispense Refill     botulinum toxin type A (BOTOX) 100 UNITS injection Inject 1.5 units 1.5 each      botulinum toxin type A (BOTOX) 100 UNITS injection Inject 1.5 units 1.5 each      botulinum toxin type A (BOTOX) 100 UNITS injection Inject 1.5 units 1.5 each      LOSARTAN POTASSIUM PO Take 25 mg by mouth daily       MELATONIN PO        LamoTRIgine (LAMICTAL PO) Take 100  mg by mouth daily       QUEtiapine Fumarate (SEROQUEL PO) Take 50 mg by mouth       Omega-3 Fatty Acids (OMEGA-3 FISH OIL PO)        simvastatin (ZOCOR) 20 MG tablet Take by mouth At Bedtime       aspirin (ASPIRIN LOW DOSE) 81 MG tablet Take by mouth daily       omeprazole 20 MG tablet Take 20 mg by mouth daily         PHYSICAL EXAMINATION:  He  is awake, alert and in no apparent distress.    His tympanic membranes are clear and intact bilaterally. External auditory canals are clear.  Nasal exam shows a mild septal deviation without obstruction.  Examination of the oral cavity shows no suspicious lesions.  There is symmetric movement of the tongue and soft palate.    The oropharynx is clear.  His neck is supple without significant adenopathy.  Pulse is regular.  Upper airway is clear.  Cranial nerves II-XII are grossly intact.         PROCEDURE: After obtaining consent, the patient was placed in the supine position. Ground and reference electrodes were applied. The anterior neck was cleaned with an alcohol swab.  Using a 27-gauge, unipolar electromyography needle, the larynx was entered through the cricothyroid space.  0.75 units of botulinum A toxin was injected into each thyroarytenoid muscle.  There was a Strong EMG response to phonation on the left side and a Strong EMG response to phonation on the right side.  The total amount of botulinum A toxin delivered today was 1.5 units. An additional 5 units of botulinum A toxin was necessarily wasted in preparation for the injection. he tolerated the procedure well and left the clinic after a short observation period.         The EMG was necessary specifically in this case to identify areas of muscle overactivity as well as to access the thyroarytenoid muscle which is beneath the thyroid cartilage and is not otherwise directly accessible without EMG guidance.    PLAN: I will have him  follow up on a PRN basis.          Again, thank you for allowing me to participate in  the care of your patient.      Sincerely,    Kameron Mcarthur MD

## 2018-02-20 NOTE — PATIENT INSTRUCTIONS
"[facility] EAR NOSE AND THROAT  [address] [address]  Dept: [phone]  ______________________________________________________________________________    Patient: Rodrigo Gann   : 1960   MRN: [id number]   2018    INVASIVE PROCEDURE SAFETY CHECKLIST    Date: 2018   Procedure:Botox injection  Patient Name: Rodrigo Gann  MRN: [id number]  YOB: 1960    Action: Complete sections as appropriate. Any discrepancy results in a HARD COPY until resolved.     PRE PROCEDURE:  Patient ID verified with 2 identifiers (name and  or MRN): Yes  Procedure and site verified with patient/designee (when able): Yes  Accurate consent documentation in medical record: Yes  H&P (or appropriate assessment) documented in medical record: NA  H&P must be up to 20 days prior to procedure and updates within 24 hours of procedure as applicable: {NA YES NO, EXPLAIN:370888::\"no  Relevant diagnostic and radiology test results appropriately labeled and displayed as applicable: no  Procedure site(s) marked with provider initials: {NA YES NO, EXPLAIN:904800:no    TIMEOUT:  Time-Out performed immediately prior to starting procedure, including verbal and active participation of all team members addressing the following:Yes  * Correct patient identify  * Confirmed that the correct side and site are marked  * An accurate procedure consent form  * Agreement on the procedure to be done  * Correct patient position  * Relevant images and results are properly labeled and appropriately displayed  * The need to administer antibiotics or fluids for irrigation purposes during the procedure as applicable   * Safety precautions based on patient history or medication use    DURING PROCEDURE: Verification of correct person, site, and procedures any time the responsibility for care of the patient is transferred to another member of the care team.     Today you have had a  Botox injection  1. Do " not expect any effect for about two days  2. You might have a period of breathiness for a few days up to a week. You might have no breathiness, everyone is different  3. When the Botox starts to wear off, call the clinic to schedule another appointment. ON THE AVERAGE, that is about every 3-4 months  4. Scheduling number is [phone], option 1  5. If the botox schedule is full, ask the  to talk to Mara, the nurse who works with Dr Mcarthur  6. Dr Love normal Botox Clinics are the first Thursday of every month, in the afternoon and the third Tuesday of the month, in the morning.  7. Please call the triage RN with questions/concerns: [phone] option 2.

## 2018-02-20 NOTE — MR AVS SNAPSHOT
"              After Visit Summary   2018    Rodrigo Gann    MRN: [id number]           Patient Information     Date Of Birth          1960        Visit Information        Provider Department      2018 10:25 AM Kameron Mcarthur MD [facility] Ear Nose and Throat        Today's Diagnoses     Laryngeal spasm    -  1      Care Instructions    [facility] EAR NOSE AND THROAT  [address] [address]  Dept: [phone]  ______________________________________________________________________________    Patient: Rodrigo Gann   : 1960   MRN: [id number]   2018    INVASIVE PROCEDURE SAFETY CHECKLIST    Date: 2018   Procedure:Botox injection  Patient Name: Rodrigo Gann  MRN: [id number]  YOB: 1960    Action: Complete sections as appropriate. Any discrepancy results in a HARD COPY until resolved.     PRE PROCEDURE:  Patient ID verified with 2 identifiers (name and  or MRN): Yes  Procedure and site verified with patient/designee (when able): Yes  Accurate consent documentation in medical record: Yes  H&P (or appropriate assessment) documented in medical record: NA  H&P must be up to 20 days prior to procedure and updates within 24 hours of procedure as applicable: {NA YES NO, EXPLAIN:[id number]::\"no  Relevant diagnostic and radiology test results appropriately labeled and displayed as applicable: no  Procedure site(s) marked with provider initials: {NA YES NO, EXPLAIN:853283:no    TIMEOUT:  Time-Out performed immediately prior to starting procedure, including verbal and active participation of all team members addressing the following:Yes  * Correct patient identify  * Confirmed that the correct side and site are marked  * An accurate procedure consent form  * Agreement on the procedure to be done  * Correct patient position  * Relevant images and results are properly labeled and appropriately displayed  * The need to administer " antibiotics or fluids for irrigation purposes during the procedure as applicable   * Safety precautions based on patient history or medication use    DURING PROCEDURE: Verification of correct person, site, and procedures any time the responsibility for care of the patient is transferred to another member of the care team.     Today you have had a  Botox injection  1. Do not expect any effect for about two days  2. You might have a period of breathiness for a few days up to a week. You might have no breathiness, everyone is different  3. When the Botox starts to wear off, call the clinic to schedule another appointment. ON THE AVERAGE, that is about every 3-4 months  4. Scheduling number is [phone], option 1  5. If the botox schedule is full, ask the  to talk to Mara, the nurse who works with Dr Mcarthur  6. Dr Love normal Botox Clinics are the first Thursday of every month, in the afternoon and the third Tuesday of the month, in the morning.  7. Please call the triage RN with questions/concerns: [phone] option 2.                        Follow-ups after your visit        Follow-up notes from your care team     Return if symptoms worsen or fail to improve.      Who to contact     Please call your clinic at [phone] to:    Ask questions about your health    Make or cancel appointments    Discuss your medicines    Learn about your test results    Speak to your doctor            Additional Information About Your Visit        [employer] Information     [employer] is an electronic gateway that provides easy, online access to your medical records. With [employer], you can request a clinic appointment, read your test results, renew a prescription or communicate with your care team.     To sign up for [employer] visit the website at [email or web address]   You will be asked to enter the access code listed below, as well as some personal information. Please follow the directions to create your username and  password.     Your access code is: [email or web address]  Expires: 2018  6:30 AM     Your access code will  in 90 days. If you need help or a new code, please contact your [facility] or call [phone] for assistance.        Care EveryWhere ID     This is your Care EveryWhere ID. This could be used by other organizations to access your [facility] medical records  [id number]         Blood Pressure from Last 3 Encounters:   No data found for BP    Weight from Last 3 Encounters:   No data found for Wt              We Performed the Following     HC CHEMODENERVATION MUSCLE LARYNX BILAT W/EMG        Primary Care Provider Office Phone # Fax #    Donita HUANG Edlund [phone]       [address] [address]        Equal Access to Services     SAROJ MAGDALENO : Hadii aad ku hadasho Soomaali, waaxda luqadaha, qaybta kaalmada adeegyada, waxay dinorah haycandice jordan . So [facility] [phone].    ATENCIÓN: Si habla español, tiene a aguirre disposición servicios gratuitos de asistencia lingüística. William al [phone].    We comply with applicable federal civil rights laws and [place] laws. We do not discriminate on the basis of race, color, national origin, age, disability, sex, sexual orientation, or gender identity.            Thank you!     Thank you for choosing [facility] EAR NOSE AND THROAT  for your care. Our goal is always to provide you with excellent care. Hearing back from our patients is one way we can continue to improve our services. Please take a few minutes to complete the written survey that you may receive in the mail after your visit with us. Thank you!             Your Updated Medication List - Protect others around you: Learn how to safely use, store and throw away your medicines at www.disposemymeds.org.          This list is accurate as of 18 10:36 AM.  Always use your most recent med list.                   Brand Name Dispense  Instructions for use Diagnosis    ASPIRIN LOW DOSE 81 MG tablet   Generic drug:  aspirin      Take by mouth daily        * BOTOX 100 UNITS injection   Generic drug:  botulinum toxin type A     1.5 each    Inject 1.5 units    Larynx disorder       * BOTOX 100 UNITS injection   Generic drug:  botulinum toxin type A     1.5 each    Inject 1.5 units    Laryngeal spasm       * BOTOX 100 UNITS injection   Generic drug:  botulinum toxin type A     1.5 each    Inject 1.5 units    Larynx disorder       LAMICTAL PO      Take 100 mg by mouth daily        LOSARTAN POTASSIUM PO      Take 25 mg by mouth daily        MELATONIN PO           OMEGA-3 FISH OIL PO           omeprazole 20 MG tablet      Take 20 mg by mouth daily        SEROQUEL PO      Take 50 mg by mouth        simvastatin 20 MG tablet    ZOCOR     Take by mouth At Bedtime        SM COENZYME Q-10 100 MG Caps capsule   Generic drug:  co-enzyme Q-10      Take 200 mg by mouth daily        vitamin D3 2000 UNITS Caps      Take 1 tablet by mouth daily        * Notice:  This list has 3 medication(s) that are the same as other medications prescribed for you. Read the directions carefully, and ask your doctor or other care provider to review them with you.

## 2018-06-06 ENCOUNTER — EXTERNAL ORDER RESULTS (OUTPATIENT)
Dept: CARE COORDINATION | Facility: CLINIC | Age: 58
End: 2018-06-06

## 2018-06-19 ENCOUNTER — OFFICE VISIT (OUTPATIENT)
Dept: OTOLARYNGOLOGY | Facility: CLINIC | Age: 58
End: 2018-06-19
Payer: COMMERCIAL

## 2018-06-19 DIAGNOSIS — J38.3 OTHER DISEASES OF VOCAL CORDS: Primary | ICD-10-CM

## 2018-06-19 NOTE — MR AVS SNAPSHOT
After Visit Summary   6/19/2018    Rodrigo Gann    MRN: [id number]           Patient Information     Date Of Birth          1960        Visit Information        Provider Department      6/19/2018 10:35 AM Kameron Mcarthur MD [facility]        Today's Diagnoses     Other diseases of vocal cords    -  1      Care Instructions    Today you have had a  Botox injection  1. Do not expect any effect for about two days  2. You might have a period of breathiness for a few days up to a week. You might have no breathiness, everyone is different  3. When the Botox starts to wear off, call the clinic to schedule another appointment. ON THE AVERAGE, that is about every 3-4 months  4. Scheduling number is [phone], option 1  5. If the botox schedule is full, ask the  to talk to Mara, the nurse who works with Dr Mcarthur  6. Dr Love normal Botox Clinics are the first Thursday of every month, in the afternoon and the third Tuesday of the month, in the morning.  7. Please call the triage RN with questions/concerns: [phone] option 2.            Follow-ups after your visit        Follow-up notes from your care team     Return if symptoms worsen or fail to improve.      Who to contact     Please call your clinic at [phone] to:    Ask questions about your health    Make or cancel appointments    Discuss your medicines    Learn about your test results    Speak to your doctor            Additional Information About Your Visit        MyChart Information     ISVSt is an electronic gateway that provides easy, online access to your medical records. With [employer], you can request a clinic appointment, read your test results, renew a prescription or communicate with your care team.     To sign up for ISVSt visit the website at [email or web address]   You will be asked to enter the access code listed below, as well as some personal information. Please follow the  directions to create your username and password.     Your access code is: [email or web address]  Expires: 9/3/2018  6:30 AM     Your access code will  in 90 days. If you need help or a new code, please contact your [facility] or call [phone] for assistance.        Care EveryWhere ID     This is your Care EveryWhere ID. This could be used by other organizations to access your [place] medical records  [id number]         Blood Pressure from Last 3 Encounters:   No data found for BP    Weight from Last 3 Encounters:   No data found for Wt              We Performed the Following     HC CHEMODENERVATION MUSCLE LARYNX BILAT W/EMG        Primary Care Provider Office Phone # Fax #    Donita HUANG Edlund [phone]       [address] [address]        Equal Access to Services     SAROJ MAGDALENO : Hadii aad ku hadasho Soomaali, waaxda luqadaha, qaybta kaalmada adeegyada, lonnie copeland haycandice jordan . So [facility] [phone].    ATENCIÓN: Si habla español, tiene a aguirre disposición servicios gratuitos de asistencia lingüística. Lylyame al [phone].    We comply with applicable federal civil rights laws and [place] laws. We do not discriminate on the basis of race, color, national origin, age, disability, sex, sexual orientation, or gender identity.            Thank you!     Thank you for choosing [facility] EAR NOSE AND THROAT  for your care. Our goal is always to provide you with excellent care. Hearing back from our patients is one way we can continue to improve our services. Please take a few minutes to complete the written survey that you may receive in the mail after your visit with us. Thank you!             Your Updated Medication List - Protect others around you: Learn how to safely use, store and throw away your medicines at www.disposemymeds.org.          This list is accurate as of 18 10:39 AM.  Always use your most recent med list.                    Brand Name Dispense Instructions for use Diagnosis    ASPIRIN LOW DOSE 81 MG tablet   Generic drug:  aspirin      Take by mouth daily        * BOTOX 100 units injection   Generic drug:  botulinum toxin type A     1.5 each    Inject 1.5 units    Larynx disorder       * BOTOX 100 units injection   Generic drug:  botulinum toxin type A     1.5 each    Inject 1.5 units    Laryngeal spasm       * BOTOX 100 units injection   Generic drug:  botulinum toxin type A     1.5 each    Inject 1.5 units    Larynx disorder       LAMICTAL PO      Take 100 mg by mouth daily        LOSARTAN POTASSIUM PO      Take 25 mg by mouth daily        MELATONIN PO           OMEGA-3 FISH OIL PO           omeprazole 20 MG tablet      Take 20 mg by mouth daily        SEROQUEL PO      Take 50 mg by mouth        simvastatin 20 MG tablet    ZOCOR     Take by mouth At Bedtime        SM COENZYME Q-10 100 MG Caps capsule   Generic drug:  co-enzyme Q-10      Take 200 mg by mouth daily        vitamin D3 2000 units Caps      Take 1 tablet by mouth daily        * Notice:  This list has 3 medication(s) that are the same as other medications prescribed for you. Read the directions carefully, and ask your doctor or other care provider to review them with you.

## 2018-06-19 NOTE — LETTER
6/19/2018       RE: Rodrigo Gann  [address] [facility] [address]     Dear Colleague,    Thank you for referring your patient, Rodrigo Gann, to the [facility] EAR NOSE AND THROAT at [facility]. Please see a copy of my visit note below.      Rodrigo Gann is a 58 year old male with a history of adductor laryngeal dystonia and laryngeal spasm.  he was last injected on 2/20/2018. he had a good response to the last injection. The last dose given was 0.75 units into each thyroarytenoid muscle.  After the injection  he had a breathy dysphonia for 0.5 weeks.There was no Dysphagia or Dyspnea.  The response lasted for 4 months.   Our plan is to give 0.75 units of Botulinum A toxin into  each thyroarytenoid muscle today.      PROCEDURE: After obtaining consent, the patient was placed in the supine position. Ground and reference electrodes were applied. The anterior neck was cleaned with an alcohol swab.  Using a 27-gauge, unipolar electromyography needle, the larynx was entered through the cricothyroid space.  0.75 units of botulinum A toxin was injected into each thyroarytenoid muscle.  There was a Strong EMG response to phonation on the left side and a Strong EMG response to phonation on the right side.  The total amount of botulinum A toxin delivered today was 1.5 units. An additional 5 units of botulinum A toxin was necessarily wasted in preparation for the injection. he tolerated the procedure well and left the clinic after a short observation period.         The EMG was necessary specifically in this case to identify areas of muscle overactivity as well as to access the thyroarytenoid muscle which is beneath the thyroid cartilage and is not otherwise directly accessible without EMG guidance.    PLAN: I will have him  follow up on a PRN basis.          Again, thank you for allowing me to participate in the care of your patient.      Sincerely,    Kameron Mcarthur,  MD

## 2018-06-19 NOTE — PROGRESS NOTES
Rodrigo Gann is a 58 year old male with a history of adductor laryngeal dystonia and laryngeal spasm.  he was last injected on 2/20/2018. he had a good response to the last injection. The last dose given was 0.75 units into each thyroarytenoid muscle.  After the injection  he had a breathy dysphonia for 0.5 weeks.There was no Dysphagia or Dyspnea.  The response lasted for 4 months.   Our plan is to give 0.75 units of Botulinum A toxin into  each thyroarytenoid muscle today.      PROCEDURE: After obtaining consent, the patient was placed in the supine position. Ground and reference electrodes were applied. The anterior neck was cleaned with an alcohol swab.  Using a 27-gauge, unipolar electromyography needle, the larynx was entered through the cricothyroid space.  0.75 units of botulinum A toxin was injected into each thyroarytenoid muscle.  There was a Strong EMG response to phonation on the left side and a Strong EMG response to phonation on the right side.  The total amount of botulinum A toxin delivered today was 1.5 units. An additional 5 units of botulinum A toxin was necessarily wasted in preparation for the injection. he tolerated the procedure well and left the clinic after a short observation period.         The EMG was necessary specifically in this case to identify areas of muscle overactivity as well as to access the thyroarytenoid muscle which is beneath the thyroid cartilage and is not otherwise directly accessible without EMG guidance.    PLAN: I will have him  follow up on a PRN basis.

## 2018-06-19 NOTE — PATIENT INSTRUCTIONS
Today you have had a  Botox injection  1. Do not expect any effect for about two days  2. You might have a period of breathiness for a few days up to a week. You might have no breathiness, everyone is different  3. When the Botox starts to wear off, call the clinic to schedule another appointment. ON THE AVERAGE, that is about every 3-4 months  4. Scheduling number is [phone], option 1  5. If the botox schedule is full, ask the  to talk to Mara, the nurse who works with Dr Mcarthur  6. Dr Love normal Botox Clinics are the first Thursday of every month, in the afternoon and the third Tuesday of the month, in the morning.  7. Please call the triage RN with questions/concerns: [phone] option 2.

## 2018-06-19 NOTE — NURSING NOTE
"[facility] EAR NOSE AND THROAT  [address] [address]  Dept: [phone]  ______________________________________________________________________________    Patient: Rodrigo Gann   : 1960   MRN: [id number]   2018    INVASIVE PROCEDURE SAFETY CHECKLIST    Date: 2018   Procedure:botox injection  Patient Name: Rodrigo Gann  MRN: [id number]  YOB: 1960    Action: Complete sections as appropriate. Any discrepancy results in a HARD COPY until resolved.     PRE PROCEDURE:  Patient ID verified with 2 identifiers (name and  or MRN): Yes  Procedure and site verified with patient/designee (when able): Yes  Accurate consent documentation in medical record: Yes  H&P (or appropriate assessment) documented in medical record: NA  H&P must be up to 20 days prior to procedure and updates within 24 hours of procedure as applicable: {NA YES NO, EXPLAIN:478224::\"no  Relevant diagnostic and radiology test results appropriately labeled and displayed as applicable: no  Procedure site(s) marked with provider initials: {NA YES NO, EXPLAIN:665669:no    TIMEOUT:  Time-Out performed immediately prior to starting procedure, including verbal and active participation of all team members addressing the following:Yes  * Correct patient identify  * Confirmed that the correct side and site are marked  * An accurate procedure consent form  * Agreement on the procedure to be done  * Correct patient position  * Relevant images and results are properly labeled and appropriately displayed  * The need to administer antibiotics or fluids for irrigation purposes during the procedure as applicable   * Safety precautions based on patient history or medication use    DURING PROCEDURE: Verification of correct person, site, and procedures any time the responsibility for care of the patient is transferred to another member of the care team.               "

## 2018-10-04 ENCOUNTER — OFFICE VISIT (OUTPATIENT)
Dept: OTOLARYNGOLOGY | Facility: CLINIC | Age: 58
End: 2018-10-04
Payer: COMMERCIAL

## 2018-10-04 DIAGNOSIS — J38.3 OTHER DISEASES OF VOCAL CORDS: Primary | ICD-10-CM

## 2018-10-04 ASSESSMENT — PAIN SCALES - GENERAL: PAINLEVEL: NO PAIN (0)

## 2018-10-04 NOTE — PATIENT INSTRUCTIONS
Patient here today to see Dr Mcarthur for their return botox injection.  1. Patient instructed to monitor period of breathiness and period of positive botox effect.  2. Patient reminded to schedule next botox appointment when symptoms return.  3. Patient understands who to call with questions and concerns and has clinic phone number.  4 Patient stable when discharged.

## 2018-10-04 NOTE — PROGRESS NOTES
Rodrigo Gann is a 58 year old male with a history of adductor laryngeal dystonia and laryngeal spasm.  he was last injected on 6/19/2018. he had a good response to the last injection. The last dose given was 0.75 units into each thyroarytenoid muscle.  After the injection  he had a breathy dysphonia for 0.5 weeks.There was no Dysphagia or Dyspnea.  The response lasted for 3.5 months.   Our plan is to give 0.75 units of Botulinum A toxin into  each thyroarytenoid muscle today.      PROCEDURE: After obtaining consent, the patient was placed in the supine position. Ground and reference electrodes were applied. The anterior neck was cleaned with an alcohol swab.  Using a 27-gauge, unipolar electromyography needle, the larynx was entered through the cricothyroid space.  0.75 units of botulinum A toxin was injected into each thyroarytenoid muscle.  There was a Strong EMG response to phonation on the left side and a Strong EMG response to phonation on the right side.  The total amount of botulinum A toxin delivered today was 1.5 units. An additional 5 units of botulinum A toxin was necessarily wasted in preparation for the injection. he tolerated the procedure well and left the clinic after a short observation period.         The EMG was necessary specifically in this case to identify areas of muscle overactivity as well as to access the thyroarytenoid muscle which is beneath the thyroid cartilage and is not otherwise directly accessible without EMG guidance.    PLAN: I will have him  follow up on a PRN basis.

## 2018-10-04 NOTE — LETTER
10/4/2018       RE: Rodrigo Gann  [address] [facility] [address]     Dear Colleague,    Thank you for referring your patient, Rodrigo Gann, to the [facility] EAR NOSE AND THROAT at [facility]. Please see a copy of my visit note below.      Rodrigo Gann is a 58 year old male with a history of adductor laryngeal dystonia and laryngeal spasm.  he was last injected on 6/19/2018. he had a good response to the last injection. The last dose given was 0.75 units into each thyroarytenoid muscle.  After the injection  he had a breathy dysphonia for 0.5 weeks.There was no Dysphagia or Dyspnea.  The response lasted for 3.5 months.   Our plan is to give 0.75 units of Botulinum A toxin into  each thyroarytenoid muscle today.      PROCEDURE: After obtaining consent, the patient was placed in the supine position. Ground and reference electrodes were applied. The anterior neck was cleaned with an alcohol swab.  Using a 27-gauge, unipolar electromyography needle, the larynx was entered through the cricothyroid space.  0.75 units of botulinum A toxin was injected into each thyroarytenoid muscle.  There was a Strong EMG response to phonation on the left side and a Strong EMG response to phonation on the right side.  The total amount of botulinum A toxin delivered today was 1.5 units. An additional 5 units of botulinum A toxin was necessarily wasted in preparation for the injection. he tolerated the procedure well and left the clinic after a short observation period.         The EMG was necessary specifically in this case to identify areas of muscle overactivity as well as to access the thyroarytenoid muscle which is beneath the thyroid cartilage and is not otherwise directly accessible without EMG guidance.    PLAN: I will have him  follow up on a PRN basis.          Again, thank you for allowing me to participate in the care of your patient.      Sincerely,    Kameron Mcarthur,  MD

## 2018-10-04 NOTE — MR AVS SNAPSHOT
After Visit Summary   10/4/2018    Rodrigo Gann    MRN: [id number]           Patient Information     Date Of Birth          1960        Visit Information        Provider Department      10/4/2018 2:10 PM Kameron Mcarthur MD [facility] Ear Nose [facility] Throat        Today's Diagnoses     Other diseases of vocal cords    -  1      Care Instructions    Patient here today to see Dr Mcarthur for their return botox injection.  1. Patient instructed to monitor period of breathiness and period of positive botox effect.  2. Patient reminded to schedule next botox appointment when symptoms return.  3. Patient understands who to call with questions and concerns and has clinic phone number.  4 Patient stable when discharged.            Follow-ups after your visit        Follow-up notes from your care team     Return if symptoms worsen or fail to improve.      Who to contact     Please call your clinic at [phone] to:    Ask questions about your health    Make or cancel appointments    Discuss your medicines    Learn about your test results    Speak to your doctor            Additional Information About Your Visit        Care EveryWhere ID     This is your Care EveryWhere ID. This could be used by other organizations to access your [place] medical records  [id number]         Blood Pressure from Last 3 Encounters:   No data found for BP    Weight from Last 3 Encounters:   No data found for Wt              We Performed the Following     HC CHEMODENERVATION MUSCLE LARYNX BILAT W/EMG        Primary Care Provider    None Specified       No primary provider on file.        Equal Access to Services     LAYA MAGDALENO : Gerber starks Solaureen, waaxda luqadaha, qaybta kaalmada adeegyada, lonnie christensen adeanamika cullen. So [facility] [phone].    ATENCIÓN: Si habla español, tiene a aguirre disposición servicios gratsanjuanaos de asistencia lingüística. Llame al [phone].    We comply with applicable  federal civil rights laws and [place] laws. We do not discriminate on the basis of race, color, national origin, age, disability, sex, sexual orientation, or gender identity.            Thank you!     Thank you for choosing [facility] EAR NOSE AND THROAT  for your care. Our goal is always to provide you with excellent care. Hearing back from our patients is one way we can continue to improve our services. Please take a few minutes to complete the written survey that you may receive in the mail after your visit with us. Thank you!             Your Updated Medication List - Protect others around you: Learn how to safely use, store and throw away your medicines at www.disposemymeds.org.          This list is accurate as of 10/4/18  2:29 PM.  Always use your most recent med list.                   Brand Name Dispense Instructions for use Diagnosis    ASPIRIN LOW DOSE 81 MG tablet   Generic drug:  aspirin      Take by mouth daily        * BOTOX 100 units injection   Generic drug:  botulinum toxin type A     1.5 each    Inject 1.5 units    Larynx disorder       * BOTOX 100 units injection   Generic drug:  botulinum toxin type A     1.5 each    Inject 1.5 units    Laryngeal spasm       * BOTOX 100 units injection   Generic drug:  botulinum toxin type A     1.5 each    Inject 1.5 units    Larynx disorder       LAMICTAL PO      Take 100 mg by mouth daily        LOSARTAN POTASSIUM PO      Take 25 mg by mouth daily        MELATONIN PO           OMEGA-3 FISH OIL PO           omeprazole 20 MG tablet      Take 20 mg by mouth daily        SEROQUEL PO      Take 50 mg by mouth        simvastatin 20 MG tablet    ZOCOR     Take by mouth At Bedtime        SM COENZYME Q-10 100 MG Caps capsule   Generic drug:  co-enzyme Q-10      Take 200 mg by mouth daily        vitamin D3 2000 units Caps      Take 1 tablet by mouth daily        * Notice:  This list has 3 medication(s) that are the same as other medications prescribed for you. Read the  directions carefully, and ask your doctor or other care provider to review them with you.

## 2018-10-04 NOTE — NURSING NOTE
"[facility] EAR NOSE AND THROAT  [address] [address]  Dept: [phone]  ______________________________________________________________________________    Patient: Rodrigo Gann   : 1960   MRN: [id number]   2018    INVASIVE PROCEDURE SAFETY CHECKLIST    Date: 10/4/2018   Procedure:botox  Patient Name: Rodrigo Gann  MRN: [id number]  YOB: 1960    Action: Complete sections as appropriate. Any discrepancy results in a HARD COPY until resolved.     PRE PROCEDURE:  Patient ID verified with 2 identifiers (name and  or MRN): Yes  Procedure and site verified with patient/designee (when able): Yes  Accurate consent documentation in medical record: Yes  H&P (or appropriate assessment) documented in medical record: NA  H&P must be up to 20 days prior to procedure and updates within 24 hours of procedure as applicable: {NA YES NO, EXPLAIN:[id number]::\"no  Relevant diagnostic and radiology test results appropriately labeled and displayed as applicable: no  Procedure site(s) marked with provider initials: {NA YES NO, EXPLAIN:[id number]:no    TIMEOUT:  Time-Out performed immediately prior to starting procedure, including verbal and active participation of all team members addressing the following:Yes  * Correct patient identify  * Confirmed that the correct side and site are marked  * An accurate procedure consent form  * Agreement on the procedure to be done  * Correct patient position  * Relevant images and results are properly labeled and appropriately displayed  * The need to administer antibiotics or fluids for irrigation purposes during the procedure as applicable   * Safety precautions based on patient history or medication use    DURING PROCEDURE: Verification of correct person, site, and procedures any time the responsibility for care of the patient is transferred to another member of the care team.                 "

## 2019-02-12 ENCOUNTER — OFFICE VISIT (OUTPATIENT)
Dept: OTOLARYNGOLOGY | Facility: CLINIC | Age: 59
End: 2019-02-12
Payer: COMMERCIAL

## 2019-02-12 DIAGNOSIS — J38.3 OTHER DISEASES OF VOCAL CORDS: Primary | ICD-10-CM

## 2019-02-12 NOTE — PROGRESS NOTES
Rodrigo Gann is a 58 year old male with a history of adductor laryngeal dystonia and laryngeal spasm.  he was last injected on 10/4/2018. he had a good response to the last injection. The last dose given was 0.75 units into each thyroarytenoid muscle.  After the injection  he had a breathy dysphonia for 0.5 weeks.There was no Dyspnea.  He had very mild liquid dysphagia for 2 weeks.  The response lasted for 3 months.   Our plan is to give 0.75  units of Botulinum A toxin into  each thyroarytenoid muscle today.  I will see if injecting the anterior aspect of the thyroid arytenoid muscle will improve his side effects of injection.      PROCEDURE: After obtaining consent, the patient was placed in the supine position. Ground and reference electrodes were applied. The anterior neck was cleaned with an alcohol swab.  Using a 27-gauge, unipolar electromyography needle, the larynx was entered through the cricothyroid space.  0.75 units of botulinum A toxin was injected into each thyroarytenoid muscle.  There was a Strong EMG response to phonation on the left side and a Strong EMG response to phonation on the right side.  The total amount of botulinum A toxin delivered today was 1.5 units. An additional 5 units of botulinum A toxin was necessarily wasted in preparation for the injection. he tolerated the procedure well and left the clinic after a short observation period.         The EMG was necessary specifically in this case to identify areas of muscle overactivity as well as to access the thyroarytenoid muscle which is beneath the thyroid cartilage and is not otherwise directly accessible without EMG guidance.    PLAN: I will have him  follow up on a PRN basis.

## 2019-02-12 NOTE — LETTER
2/12/2019     RE: Rodrigo Gann  [address] [facility] [address]     Dear Colleague,    Thank you for referring your patient, Rodrigo Gann, to the [facility] EAR NOSE AND THROAT at [facility]. Please see a copy of my visit note below.    Rodrigo Gann is a 58 year old male with a history of adductor laryngeal dystonia and laryngeal spasm.  he was last injected on 10/4/2018. he had a good response to the last injection. The last dose given was 0.75 units into each thyroarytenoid muscle.  After the injection  he had a breathy dysphonia for 0.5 weeks.There was no Dyspnea.  He had very mild liquid dysphagia for 2 weeks.  The response lasted for 3 months.   Our plan is to give 0.75  units of Botulinum A toxin into  each thyroarytenoid muscle today.  I will see if injecting the anterior aspect of the thyroid arytenoid muscle will improve his side effects of injection.      PROCEDURE: After obtaining consent, the patient was placed in the supine position. Ground and reference electrodes were applied. The anterior neck was cleaned with an alcohol swab.  Using a 27-gauge, unipolar electromyography needle, the larynx was entered through the cricothyroid space.  0.75 units of botulinum A toxin was injected into each thyroarytenoid muscle.  There was a Strong EMG response to phonation on the left side and a Strong EMG response to phonation on the right side.  The total amount of botulinum A toxin delivered today was 1.5 units. An additional 5 units of botulinum A toxin was necessarily wasted in preparation for the injection. he tolerated the procedure well and left the clinic after a short observation period.       The EMG was necessary specifically in this case to identify areas of muscle overactivity as well as to access the thyroarytenoid muscle which is beneath the thyroid cartilage and is not otherwise directly accessible without EMG guidance.    PLAN: I will have him  follow up  on a PRN basis.    Again, thank you for allowing me to participate in the care of your patient.      Sincerely, Kameron Mcarthur MD

## 2019-02-12 NOTE — NURSING NOTE
"[facility] EAR NOSE AND THROAT  [address] [address]  Dept: [phone]  ______________________________________________________________________________    Patient: Rodrigo Gann   : 1960   MRN: [id number]   2019    INVASIVE PROCEDURE SAFETY CHECKLIST    Date: 2019   Procedure: Botox  Patient Name: Rodrigo Gann  MRN: [id number]  YOB: 1960    Action: Complete sections as appropriate. Any discrepancy results in a HARD COPY until resolved.     PRE PROCEDURE:  Patient ID verified with 2 identifiers (name and  or MRN): Yes  Procedure and site verified with patient/designee (when able): Yes  Accurate consent documentation in medical record: Yes  H&P (or appropriate assessment) documented in medical record: Yes  H&P must be up to 20 days prior to procedure and updates within 24 hours of procedure as applicable: {NA YES NO, EXPLAIN:[id number]::\"no  Relevant diagnostic and radiology test results appropriately labeled and displayed as applicable: no  Procedure site(s) marked with provider initials: {NA YES NO, EXPLAIN:[id number]:no    TIMEOUT:  Time-Out performed immediately prior to starting procedure, including verbal and active participation of all team members addressing the following:Yes  * Correct patient identify  * Confirmed that the correct side and site are marked  * An accurate procedure consent form  * Agreement on the procedure to be done  * Correct patient position  * Relevant images and results are properly labeled and appropriately displayed  * The need to administer antibiotics or fluids for irrigation purposes during the procedure as applicable   * Safety precautions based on patient history or medication use    DURING PROCEDURE: Verification of correct person, site, and procedures any time the responsibility for care of the patient is transferred to another member of the care team.   "

## 2019-04-11 ENCOUNTER — DOCUMENTATION ONLY (OUTPATIENT)
Dept: OTOLARYNGOLOGY | Facility: CLINIC | Age: 59
End: 2019-04-11

## 2019-04-12 NOTE — PROGRESS NOTES
Patient sent email Stating that he had a sub par voice quality and mild choaking sensation a few times a week and a higher vocal pitch with an anterior placement of Botox injection of 0.75.  I am recommending a lower dose in the more typical location.

## 2019-05-02 ENCOUNTER — OFFICE VISIT (OUTPATIENT)
Dept: OTOLARYNGOLOGY | Facility: CLINIC | Age: 59
End: 2019-05-02
Payer: COMMERCIAL

## 2019-05-02 DIAGNOSIS — J38.5 LARYNGEAL SPASM: Primary | ICD-10-CM

## 2019-05-02 DIAGNOSIS — J38.3 OTHER DISEASES OF VOCAL CORDS: ICD-10-CM

## 2019-05-02 NOTE — LETTER
"2019       RE: Rodrigo Gann  [address] [facility] [address]     Dear Colleague,    Thank you for referring your patient, Rodrigo Gann, to the [facility] EAR NOSE AND THROAT at [facility]. Please see a copy of my visit note below.    [facility] EAR NOSE AND THROAT  [address] [address] [phone]  Dept: [phone]  ______________________________________________________________________________    Patient: Rodrigo Gann   : 1960   MRN: [id number]   May 2, 2019    INVASIVE PROCEDURE SAFETY CHECKLIST    Date: 2019   Procedure: Botox  Patient Name: Rodrigo Gann  MRN: [id number]  YOB: 1960    Action: Complete sections as appropriate. Any discrepancy results in a HARD COPY until resolved.     PRE PROCEDURE:  Patient ID verified with 2 identifiers (name and  or MRN): Yes  Procedure and site verified with patient/designee (when able): Yes  Accurate consent documentation in medical record: Yes  H&P (or appropriate assessment) documented in medical record: Yes  H&P must be up to 20 days prior to procedure and updates within 24 hours of procedure as applicable: {NA YES NO, EXPLAIN:[id number]::\"no  Relevant diagnostic and radiology test results appropriately labeled and displayed as applicable: no  Procedure site(s) marked with provider initials: {NA YES NO, EXPLAIN:[id number]:no    TIMEOUT:  Time-Out performed immediately prior to starting procedure, including verbal and active participation of all team members addressing the following:Yes  * Correct patient identify  * Confirmed that the correct side and site are marked  * An accurate procedure consent form  * Agreement on the procedure to be done  * Correct patient position  * Relevant images and results are properly labeled and appropriately displayed  * The need to administer antibiotics or fluids for irrigation purposes during the procedure as applicable   * " Safety precautions based on patient history or medication use    DURING PROCEDURE: Verification of correct person, site, and procedures any time the responsibility for care of the patient is transferred to another member of the care team.       Rodrigo Gann is a 58 year old male with a history of adductor laryngeal dystonia and laryngeal spasm.  he was last injected on 2/12/2019. he had a moderate response to the last injection. The last dose given was 0.75 units into each thyroarytenoid muscle in the anterior aspect of the thyroid arytenoid muscle.  After the injection  he had a breathy dysphonia for 1 weeks.There was no Dysphagia or Dyspnea.  The quality of the response however was sub par.  the response lasted for 3 months.   Our plan is to give 3 units of Botulinum A toxin into  each thyroarytenoid muscle today.  We will give this in the more usual injection location.      PROCEDURE: After obtaining consent, the patient was placed in the supine position. Ground and reference electrodes were applied. The anterior neck was cleaned with an alcohol swab.  Using a 27-gauge, unipolar electromyography needle, the larynx was entered through the cricothyroid space.  0.75 units of botulinum A toxin was injected into each thyroarytenoid muscle.  There was a Weak EMG response to phonation on the left side and a Strong EMG response to phonation on the right side.  The total amount of botulinum A toxin delivered today was 1.5 units. An additional 5 units of botulinum A toxin was necessarily wasted in preparation for the injection. he tolerated the procedure well and left the clinic after a short observation period.         The EMG was necessary specifically in this case to identify areas of muscle overactivity as well as to access the thyroarytenoid muscle which is beneath the thyroid cartilage and is not otherwise directly accessible without EMG guidance.    PLAN: I will have him  follow up on a PRN basis.      Again, thank  you for allowing me to participate in the care of your patient.      Sincerely,    Kameron Mcarthur MD

## 2019-05-02 NOTE — PROGRESS NOTES
"[facility] EAR NOSE AND THROAT  [address] [address] [phone]  Dept: [phone]  ______________________________________________________________________________    Patient: Rodrigo Gann   : 1960   MRN: [id number]   May 2, 2019    INVASIVE PROCEDURE SAFETY CHECKLIST    Date: 2019   Procedure: Botox  Patient Name: Rodrigo Gann  MRN: [id number]  YOB: 1960    Action: Complete sections as appropriate. Any discrepancy results in a HARD COPY until resolved.     PRE PROCEDURE:  Patient ID verified with 2 identifiers (name and  or MRN): Yes  Procedure and site verified with patient/designee (when able): Yes  Accurate consent documentation in medical record: Yes  H&P (or appropriate assessment) documented in medical record: Yes  H&P must be up to 20 days prior to procedure and updates within 24 hours of procedure as applicable: {NA YES NO, EXPLAIN:[id number]::\"no  Relevant diagnostic and radiology test results appropriately labeled and displayed as applicable: no  Procedure site(s) marked with provider initials: {NA YES NO, EXPLAIN:[id number]:no    TIMEOUT:  Time-Out performed immediately prior to starting procedure, including verbal and active participation of all team members addressing the following:Yes  * Correct patient identify  * Confirmed that the correct side and site are marked  * An accurate procedure consent form  * Agreement on the procedure to be done  * Correct patient position  * Relevant images and results are properly labeled and appropriately displayed  * The need to administer antibiotics or fluids for irrigation purposes during the procedure as applicable   * Safety precautions based on patient history or medication use    DURING PROCEDURE: Verification of correct person, site, and procedures any time the responsibility for care of the patient is transferred to another member of the care team.   "

## 2019-05-02 NOTE — PROGRESS NOTES
Rodrigo Gann is a 58 year old male with a history of adductor laryngeal dystonia and laryngeal spasm.  he was last injected on 2/12/2019. he had a moderate response to the last injection. The last dose given was 0.75 units into each thyroarytenoid muscle in the anterior aspect of the thyroid arytenoid muscle.  After the injection  he had a breathy dysphonia for 1 weeks.There was no Dysphagia or Dyspnea.  The quality of the response however was sub par.  the response lasted for 3 months.   Our plan is to give 075 units of Botulinum A toxin into  each thyroarytenoid muscle today.  We will give this in the more usual injection location.      PROCEDURE: After obtaining consent, the patient was placed in the supine position. Ground and reference electrodes were applied. The anterior neck was cleaned with an alcohol swab.  Using a 27-gauge, unipolar electromyography needle, the larynx was entered through the cricothyroid space.  0.75 units of botulinum A toxin was injected into each thyroarytenoid muscle.  There was a Weak EMG response to phonation on the left side and a Strong EMG response to phonation on the right side.  The total amount of botulinum A toxin delivered today was 1.5 units. An additional 5 units of botulinum A toxin was necessarily wasted in preparation for the injection. he tolerated the procedure well and left the clinic after a short observation period.         The EMG was necessary specifically in this case to identify areas of muscle overactivity as well as to access the thyroarytenoid muscle which is beneath the thyroid cartilage and is not otherwise directly accessible without EMG guidance.    PLAN: I will have him  follow up on a PRN basis.

## 2019-07-15 NOTE — PROGRESS NOTES
[facility] EAR NOSE AND THROAT  [address] [address]  [phone]  Dept: [phone]  ______________________________________________________________________________    Patient: Rodrigo Gann   : 1960   MRN: [id number]   July 15, 2019    INVASIVE PROCEDURE SAFETY CHECKLIST    Date: 7/15/2019   Procedure:Botox  Patient Name: Rodrigo Gann  MRN: [id number]  YOB: 1960    Action: Complete sections as appropriate. Any discrepancy results in a HARD COPY until resolved.     PRE PROCEDURE:  Patient ID verified with 2 identifiers (name and  or MRN): Yes  Procedure and site verified with patient/designee (when able): Yes  Accurate consent documentation in medical record: Yes  H&P (or appropriate assessment) documented in medical record: NA  H&P must be up to 20 days prior to procedure and updates within 24 hours of procedure as applicable: NA  Relevant diagnostic and radiology test results appropriately labeled and displayed as applicable: NA  Procedure site(s) marked with provider initials: NA    TIMEOUT:  Time-Out performed immediately prior to starting procedure, including verbal and active participation of all team members addressing the following:Yes  * Correct patient identify  * Confirmed that the correct side and site are marked  * An accurate procedure consent form  * Agreement on the procedure to be done  * Correct patient position  * Relevant images and results are properly labeled and appropriately displayed  * The need to administer antibiotics or fluids for irrigation purposes during the procedure as applicable   * Safety precautions based on patient history or medication use    DURING PROCEDURE: Verification of correct person, site, and procedures any time the responsibility for care of the patient is transferred to another member of the care team.

## 2019-07-16 ENCOUNTER — OFFICE VISIT (OUTPATIENT)
Dept: OTOLARYNGOLOGY | Facility: CLINIC | Age: 59
End: 2019-07-16
Payer: COMMERCIAL

## 2019-07-16 DIAGNOSIS — J38.3 OTHER DISEASES OF VOCAL CORDS: Primary | ICD-10-CM

## 2019-07-16 NOTE — LETTER
2019       RE: Rodrigo Gann  [address] [facility] [address]     Dear Colleague,    Thank you for referring your patient, Rodrigo Gann, to the [facility] EAR NOSE AND THROAT at [facility]. Please see a copy of my visit note below.    [facility] EAR NOSE AND THROAT  [address] [address]  [phone]  Dept: [phone]  ______________________________________________________________________________    Patient: Rodrigo Gann   : 1960   MRN: [id number]   July 15, 2019    INVASIVE PROCEDURE SAFETY CHECKLIST    Date: 7/15/2019   Procedure:Botox  Patient Name: Rodrigo Gann  MRN: [id number]  YOB: 1960    Action: Complete sections as appropriate. Any discrepancy results in a HARD COPY until resolved.     PRE PROCEDURE:  Patient ID verified with 2 identifiers (name and  or MRN): Yes  Procedure and site verified with patient/designee (when able): Yes  Accurate consent documentation in medical record: Yes  H&P (or appropriate assessment) documented in medical record: NA  H&P must be up to 20 days prior to procedure and updates within 24 hours of procedure as applicable: NA  Relevant diagnostic and radiology test results appropriately labeled and displayed as applicable: NA  Procedure site(s) marked with provider initials: NA    TIMEOUT:  Time-Out performed immediately prior to starting procedure, including verbal and active participation of all team members addressing the following:Yes  * Correct patient identify  * Confirmed that the correct side and site are marked  * An accurate procedure consent form  * Agreement on the procedure to be done  * Correct patient position  * Relevant images and results are properly labeled and appropriately displayed  * The need to administer antibiotics or fluids for irrigation purposes during the procedure as applicable   * Safety precautions based on patient history or medication  use    DURING PROCEDURE: Verification of correct person, site, and procedures any time the responsibility for care of the patient is transferred to another member of the care team.                   Rodrigo Gann is a 59 year old male with a history of adductor laryngeal dystonia and laryngeal spasm.  he was last injected on 5/2/2019. he had a good response to the last injection. The last dose given was 0.75 units into each thyroarytenoid muscle.  After the injection  he had a breathy dysphonia for 0.5 weeks.There was no Dysphagia or Dyspnea.  The response lasted for 2 months.   Our plan is to give 0.75 units of Botulinum A toxin into  each thyroarytenoid muscle today.      PROCEDURE: After obtaining consent, the patient was placed in the supine position. Ground and reference electrodes were applied. The anterior neck was cleaned with an alcohol swab.  Using a 27-gauge, unipolar electromyography needle, the larynx was entered through the cricothyroid space.  0.75 units of botulinum A toxin was injected into each thyroarytenoid muscle.  There was a Strong EMG response to phonation on the left side and a Strong EMG response to phonation on the right side.  The total amount of botulinum A toxin delivered today was 1.5 units. An additional 5 units of botulinum A toxin was necessarily wasted in preparation for the injection. he tolerated the procedure well and left the clinic after a short observation period.         The EMG was necessary specifically in this case to identify areas of muscle overactivity as well as to access the thyroarytenoid muscle which is beneath the thyroid cartilage and is not otherwise directly accessible without EMG guidance.    PLAN: I will have him  follow up on a PRN basis.      Again, thank you for allowing me to participate in the care of your patient.      Sincerely,    Kameron Mcarthur MD

## 2019-07-16 NOTE — PROGRESS NOTES
Rodrigo Gann is a 59 year old male with a history of adductor laryngeal dystonia and laryngeal spasm.  he was last injected on 5/2/2019. he had a good response to the last injection. The last dose given was 0.75 units into each thyroarytenoid muscle.  After the injection  he had a breathy dysphonia for 0.5 weeks.There was no Dysphagia or Dyspnea.  The response lasted for 2 months.   Our plan is to give 0.75 units of Botulinum A toxin into  each thyroarytenoid muscle today.      PROCEDURE: After obtaining consent, the patient was placed in the supine position. Ground and reference electrodes were applied. The anterior neck was cleaned with an alcohol swab.  Using a 27-gauge, unipolar electromyography needle, the larynx was entered through the cricothyroid space.  0.75 units of botulinum A toxin was injected into each thyroarytenoid muscle.  There was a Strong EMG response to phonation on the left side and a Strong EMG response to phonation on the right side.  The total amount of botulinum A toxin delivered today was 1.5 units. An additional 5 units of botulinum A toxin was necessarily wasted in preparation for the injection. he tolerated the procedure well and left the clinic after a short observation period.         The EMG was necessary specifically in this case to identify areas of muscle overactivity as well as to access the thyroarytenoid muscle which is beneath the thyroid cartilage and is not otherwise directly accessible without EMG guidance.    PLAN: I will have him  follow up on a PRN basis.

## 2019-07-16 NOTE — PATIENT INSTRUCTIONS
Patient here today to see Dr Mcarthur for their return botox injection.  1. Patient instructed to monitor period of breathiness and period of positive botox effect.  2. Patient reminded to schedule next botox appointment when symptoms return.  3. Patient understands who to call with questions and concerns and has clinic phone number.  4. Patient stable when discharged.

## 2019-11-07 ENCOUNTER — OFFICE VISIT (OUTPATIENT)
Dept: OTOLARYNGOLOGY | Facility: CLINIC | Age: 59
End: 2019-11-07
Payer: COMMERCIAL

## 2019-11-07 VITALS — HEART RATE: 75 BPM | WEIGHT: 178 LBS

## 2019-11-07 DIAGNOSIS — J38.3 OTHER DISEASES OF VOCAL CORDS: ICD-10-CM

## 2019-11-07 DIAGNOSIS — J38.5 LARYNGEAL SPASM: Primary | ICD-10-CM

## 2019-11-07 NOTE — PROGRESS NOTES
Rodrigo Gann is a 59 year old male with a history of adductor laryngeal dystonia and laryngeal spasm.  he was last injected on 7/16/2019. he had a good response to the last injection. The last dose given was 0.75 units into each thyroarytenoid muscle.  After the injection  he had a breathy dysphonia for 2 weeks.There was no Dysphagia or Dyspnea.  The response lasted for 3 months.   Our plan is to give 0.75 units of Botulinum A toxin into  each thyroarytenoid muscle today.      PROCEDURE: After obtaining consent, the patient was placed in the supine position. Ground and reference electrodes were applied. The anterior neck was cleaned with an alcohol swab.  Using a 27-gauge, unipolar electromyography needle, the larynx was entered through the cricothyroid space.  0.75 units of botulinum A toxin was injected into each thyroarytenoid muscle.  There was a Strong EMG response to phonation on the left side and a Strong EMG response to phonation on the right side.  The total amount of botulinum A toxin delivered today was 1.5 units. An additional 5 units of botulinum A toxin was necessarily wasted in preparation for the injection. he tolerated the procedure well and left the clinic after a short observation period.         The EMG was necessary specifically in this case to identify areas of muscle overactivity as well as to access the thyroarytenoid muscle which is beneath the thyroid cartilage and is not otherwise directly accessible without EMG guidance.    PLAN: I will have him  follow up on a PRN basis.

## 2019-11-07 NOTE — LETTER
11/7/2019       RE: Rodrigo Gann  [address] [facility] [address]     Dear Colleague,    Thank you for referring your patient, Rodrigo Gann, to the [facility] EAR NOSE AND THROAT at [facility]. Please see a copy of my visit note below.    Rodrigo Gann is a 59 year old male with a history of adductor laryngeal dystonia and laryngeal spasm.  he was last injected on 7/16/2019. he had a good response to the last injection. The last dose given was 0.75 units into each thyroarytenoid muscle.  After the injection  he had a breathy dysphonia for 2 weeks.There was no Dysphagia or Dyspnea.  The response lasted for 3 months.   Our plan is to give 0.75 units of Botulinum A toxin into  each thyroarytenoid muscle today.      PROCEDURE: After obtaining consent, the patient was placed in the supine position. Ground and reference electrodes were applied. The anterior neck was cleaned with an alcohol swab.  Using a 27-gauge, unipolar electromyography needle, the larynx was entered through the cricothyroid space.  0.75 units of botulinum A toxin was injected into each thyroarytenoid muscle.  There was a Strong EMG response to phonation on the left side and a Strong EMG response to phonation on the right side.  The total amount of botulinum A toxin delivered today was 1.5 units. An additional 5 units of botulinum A toxin was necessarily wasted in preparation for the injection. he tolerated the procedure well and left the clinic after a short observation period.         The EMG was necessary specifically in this case to identify areas of muscle overactivity as well as to access the thyroarytenoid muscle which is beneath the thyroid cartilage and is not otherwise directly accessible without EMG guidance.    PLAN: I will have him  follow up on a PRN basis.          Again, thank you for allowing me to participate in the care of your patient.      Sincerely,    Kameron Mcarthur MD

## 2020-02-04 ENCOUNTER — OFFICE VISIT (OUTPATIENT)
Dept: OTOLARYNGOLOGY | Facility: CLINIC | Age: 60
End: 2020-02-04
Payer: COMMERCIAL

## 2020-02-04 VITALS
SYSTOLIC BLOOD PRESSURE: 148 MMHG | WEIGHT: 181.5 LBS | DIASTOLIC BLOOD PRESSURE: 99 MMHG | HEART RATE: 68 BPM | HEIGHT: 69 IN | BODY MASS INDEX: 26.88 KG/M2

## 2020-02-04 DIAGNOSIS — J38.5 LARYNGEAL SPASM: Primary | ICD-10-CM

## 2020-02-04 DIAGNOSIS — J38.3 OTHER DISEASES OF VOCAL CORDS: ICD-10-CM

## 2020-02-04 ASSESSMENT — PAIN SCALES - GENERAL: PAINLEVEL: NO PAIN (0)

## 2020-02-04 ASSESSMENT — MIFFLIN-ST. JEOR: SCORE: 1633.66

## 2020-02-04 NOTE — NURSING NOTE
"Chief Complaint   Patient presents with     RECHECK     Botox     BP (!) 148/99   Pulse 68   Ht 1.753 m (5' 9\")   Wt 82.3 kg (181 lb 8 oz)   BMI 26.80 kg/m        Deepika Blackburn LPN    "

## 2020-02-04 NOTE — NURSING NOTE
Invasive Procedure Safety Checklist  Procedure:  botox    Responsible person(s):  Complete sections as appropriate and electronically sign and date below.    Staff/Provider  Consent documentation on chart:  YES  H&P is not applicable (when straight local anesthesia is used).    Procedure Team  Completed by comparing informed consent documentation, information on the patient record and/or the marked surgical site, and discussion with the patient/guardian.     Verified:  (Select all that apply)  Patient identification (two indicators)  Procedure to be performed  Procedure site and /or laterality and/or level  Consent  Procedure site:  Site can not be marked due to location.  Provider Veras - Site/Laterality/Level:  No Level or Structure  Staff/Provider:  No images    Procedure Team:  *Pause for the Cause* verbal and active participation of team members- verify:  Patient name:  YES  Procedure to be performed:  YES  Site, laterality and level, noting patient position:  YES    Above steps completed as applicable (Electronic Signature, Title, Date):    YAZMIN Samuel    Note:  Any incidents of wrong patient, wrong procedure, or wrong site are reported using the Occurrence Process already in place.  The occurrence form is required to be completed immediately with this type of event.

## 2020-02-04 NOTE — LETTER
2/4/2020     RE: Rodrigo Gann  [address] [facility] [address]     Dear Colleague,    Thank you for referring your patient, Rodrigo Gann, to the [facility] EAR NOSE AND THROAT at [facility]. Please see a copy of my visit note below.    Rodrigo Gann is a 59 year old male with a history of adductor laryngeal dystonia and laryngeal spasm.  he was last injected on 11/7/2019. he had a good response to the last injection. The last dose given was 0.75 units into each thyroarytenoid muscle.  After the injection  he had a breathy dysphonia for 2 weeks.There was no Dysphagia or Dyspnea.  The response lasted for 3 months.   Our plan is to give 0.75 units of Botulinum A toxin into  each thyroarytenoid muscle today.      PROCEDURE: After obtaining consent, the patient was placed in the supine position. Ground and reference electrodes were applied. The anterior neck was cleaned with an alcohol swab.  Using a 27-gauge, unipolar electromyography needle, the larynx was entered through the cricothyroid space.  0.75 units of botulinum A toxin was injected into each thyroarytenoid muscle.  There was a Strong EMG response to phonation on the left side and a Moderate EMG response to phonation on the right side.  The total amount of botulinum A toxin delivered today was 1.5 units. An additional 5 units of botulinum A toxin was necessarily wasted in preparation for the injection. he tolerated the procedure well and left the clinic after a short observation period.         The EMG was necessary specifically in this case to identify areas of muscle overactivity as well as to access the thyroarytenoid muscle which is beneath the thyroid cartilage and is not otherwise directly accessible without EMG guidance.    PLAN: I will have him  follow up on a PRN basis.    Again, thank you for allowing me to participate in the care of your patient.      Sincerely,    Kameron Mcarthur MD

## 2020-02-04 NOTE — PROGRESS NOTES
Rodrigo Gann is a 59 year old male with a history of adductor laryngeal dystonia and laryngeal spasm.  he was last injected on 11/7/2019. he had a good response to the last injection. The last dose given was 0.75 units into each thyroarytenoid muscle.  After the injection  he had a breathy dysphonia for 2 weeks.There was no Dysphagia or Dyspnea.  The response lasted for 3 months.   Our plan is to give 0.75 units of Botulinum A toxin into  each thyroarytenoid muscle today.      PROCEDURE: After obtaining consent, the patient was placed in the supine position. Ground and reference electrodes were applied. The anterior neck was cleaned with an alcohol swab.  Using a 27-gauge, unipolar electromyography needle, the larynx was entered through the cricothyroid space.  0.75 units of botulinum A toxin was injected into each thyroarytenoid muscle.  There was a Strong EMG response to phonation on the left side and a Moderate EMG response to phonation on the right side.  The total amount of botulinum A toxin delivered today was 1.5 units. An additional 5 units of botulinum A toxin was necessarily wasted in preparation for the injection. he tolerated the procedure well and left the clinic after a short observation period.         The EMG was necessary specifically in this case to identify areas of muscle overactivity as well as to access the thyroarytenoid muscle which is beneath the thyroid cartilage and is not otherwise directly accessible without EMG guidance.    PLAN: I will have him  follow up on a PRN basis.

## 2020-03-02 ENCOUNTER — HEALTH MAINTENANCE LETTER (OUTPATIENT)
Age: 60
End: 2020-03-02

## 2020-04-20 ENCOUNTER — TELEPHONE (OUTPATIENT)
Dept: OTOLARYNGOLOGY | Facility: CLINIC | Age: 60
End: 2020-04-20

## 2020-04-20 NOTE — TELEPHONE ENCOUNTER
Spoke with patient regarding botox injection scheduled with Dr. Mcarthur on 4/28/20. Will need to postpone this injection. Due to the COVID-19 pandemic, these injections are considered high risk due to risk of coughing and droplet exposure. Assured patient that we would get him rescheduled as soon as the clinic is able to offer these injections safely. This is dependent on the safety surrounding COVID-19 exposure,  availability of COVID-19 testing and PPE. Our scheduling department will add him on the waiting list to contact him for an injection once available.    Contact information left on message for any further questions or concerns.

## 2020-06-23 ENCOUNTER — OFFICE VISIT (OUTPATIENT)
Dept: OTOLARYNGOLOGY | Facility: CLINIC | Age: 60
End: 2020-06-23
Payer: COMMERCIAL

## 2020-06-23 VITALS — BODY MASS INDEX: 26.8 KG/M2 | HEIGHT: 69 IN

## 2020-06-23 DIAGNOSIS — J38.5 LARYNGEAL SPASM: Primary | ICD-10-CM

## 2020-06-23 DIAGNOSIS — J38.3 OTHER DISEASES OF VOCAL CORDS: ICD-10-CM

## 2020-06-23 ASSESSMENT — PAIN SCALES - GENERAL: PAINLEVEL: NO PAIN (0)

## 2020-06-23 NOTE — NURSING NOTE
Invasive Procedure Safety Checklist  Procedure:  botox    Responsible person(s):  Complete sections as appropriate and electronically sign and date below.    Staff/Provider  Consent documentation on chart:  YES  H&P is not applicable (when straight local anesthesia is used).    Procedure Team  Completed by comparing informed consent documentation, information on the patient record and/or the marked surgical site, and discussion with the patient/guardian.     Verified:  (Select all that apply)  Patient identification (two indicators)  Procedure to be performed  Procedure site and /or laterality and/or level  Consent  Procedure site:  Site can not be marked due to location.  Provider Veras - Site/Laterality/Level:  No Level or Structure  Staff/Provider:  No images    Procedure Team:  *Pause for the Cause* verbal and active participation of team members- verify:  Patient name:  YES  Procedure to be performed:  YES  Site, laterality and level, noting patient position:  YES    Above steps completed as applicable (Electronic Signature, Title, Date):    Brandie Domínguez LPN      Note:  Any incidents of wrong patient, wrong procedure, or wrong site are reported using the Occurrence Process already in place.  The occurrence form is required to be completed immediately with this type of event.

## 2020-06-23 NOTE — LETTER
6/23/2020       RE: Rodrigo Gann  [address] [facility] [address]     Dear Colleague,    Thank you for referring your patient, Rodrigo Gann, to the [facility] EAR NOSE AND THROAT at [facility]. Please see a copy of my visit note below.    Rodrigo Gann is a 60 year old male with a history of adductor laryngeal dystonia and laryngeal spasm.  he was last injected on 2/4/2020. he had a good response to the last injection. The last dose given was 0.75 units into each thyroarytenoid muscle.  After the injection  he had a breathy dysphonia for 1 weeks.There was no Dysphagia or Dyspnea.  The response lasted for 3 months.   Our plan is to give 0.75 units of Botulinum A toxin into  each thyroarytenoid muscle today.      PROCEDURE: After obtaining consent, the patient was placed in the supine position. Ground and reference electrodes were applied. The anterior neck was cleaned with an alcohol swab.  Using a 27-gauge, unipolar electromyography needle, the larynx was entered through the cricothyroid space.  0.75 units of botulinum A toxin was injected into each thyroarytenoid muscle.  There was a Strong EMG response to phonation on the left side and a Strong EMG response to phonation on the right side.  The total amount of botulinum A toxin delivered today was 1.5 units. An additional 5 units of botulinum A toxin was necessarily wasted in preparation for the injection. he tolerated the procedure well and left the clinic after a short observation period.         The EMG was necessary specifically in this case to identify areas of muscle overactivity as well as to access the thyroarytenoid muscle which is beneath the thyroid cartilage and is not otherwise directly accessible without EMG guidance.    PLAN: I will have him  follow up on a PRN basis. It is anticipated that repeat injections will be required over the long term.      Sincerely,    Kameron Mcarthur MD

## 2020-06-23 NOTE — PATIENT INSTRUCTIONS
1.  You were seen in the ENT Clinic today by . If you have any questions or concerns after your appointment, please call [phone]. Press option #1 for scheduling related needs. Press option #3 for Nurse advice.    2.  Plan is to return to clinic as needed for repeat botox treatment    Brandie Domínguez LPN  [facility] - Otolaryngology

## 2020-06-23 NOTE — PROGRESS NOTES
Rodrigo Gann is a 60 year old male with a history of adductor laryngeal dystonia and laryngeal spasm.  he was last injected on 2/4/2020. he had a good response to the last injection. The last dose given was 0.75 units into each thyroarytenoid muscle.  After the injection  he had a breathy dysphonia for 1 weeks.There was no Dysphagia or Dyspnea.  The response lasted for 3 months.   Our plan is to give 0.75 units of Botulinum A toxin into  each thyroarytenoid muscle today.      PROCEDURE: After obtaining consent, the patient was placed in the supine position. Ground and reference electrodes were applied. The anterior neck was cleaned with an alcohol swab.  Using a 27-gauge, unipolar electromyography needle, the larynx was entered through the cricothyroid space.  0.75 units of botulinum A toxin was injected into each thyroarytenoid muscle.  There was a Strong EMG response to phonation on the left side and a Strong EMG response to phonation on the right side.  The total amount of botulinum A toxin delivered today was 1.5 units. An additional 5 units of botulinum A toxin was necessarily wasted in preparation for the injection. he tolerated the procedure well and left the clinic after a short observation period.         The EMG was necessary specifically in this case to identify areas of muscle overactivity as well as to access the thyroarytenoid muscle which is beneath the thyroid cartilage and is not otherwise directly accessible without EMG guidance.    PLAN: I will have him  follow up on a PRN basis. It is anticipated that repeat injections will be required over the long term.

## 2020-09-15 ENCOUNTER — OFFICE VISIT (OUTPATIENT)
Dept: OTOLARYNGOLOGY | Facility: CLINIC | Age: 60
End: 2020-09-15
Payer: COMMERCIAL

## 2020-09-15 VITALS — OXYGEN SATURATION: 98 % | TEMPERATURE: 98 F | WEIGHT: 180 LBS | BODY MASS INDEX: 26.58 KG/M2 | HEART RATE: 72 BPM

## 2020-09-15 DIAGNOSIS — J38.5 LARYNGEAL SPASM: Primary | ICD-10-CM

## 2020-09-15 DIAGNOSIS — J38.3 OTHER DISEASES OF VOCAL CORDS: ICD-10-CM

## 2020-09-15 ASSESSMENT — PAIN SCALES - GENERAL: PAINLEVEL: NO PAIN (0)

## 2020-09-15 NOTE — PROGRESS NOTES
Rodrigo Gann is a 60 year old male with a history of adductor laryngeal dystonia and laryngeal spasm.  he was last injected on 6/23/2020 . he had a good response to the last injection. The last dose given was 0.75 units into each thyroarytenoid muscle.  After the injection  he had a breathy dysphonia for 2 weeks.There was no Dysphagia or Dyspnea.  The response lasted for 2.5 months.   Our plan is to give 0.75 units of Botulinum A toxin into  each thyroarytenoid muscle today.      PROCEDURE: After obtaining consent, the patient was placed in the supine position. Ground and reference electrodes were applied. The anterior neck was cleaned with an alcohol swab.  Using a 27-gauge, unipolar electromyography needle, the larynx was entered through the cricothyroid space.  0.75 units of botulinum A toxin was injected into each thyroarytenoid muscle.  There was a Strong EMG response to phonation on the left side and a Strong EMG response to phonation on the right side.  The total amount of botulinum A toxin delivered today was 1.5 units. An additional 5 units of botulinum A toxin was necessarily wasted in preparation for the injection. he tolerated the procedure well and left the clinic after a short observation period.         The EMG was necessary specifically in this case to identify areas of muscle overactivity as well as to access the thyroarytenoid muscle which is beneath the thyroid cartilage and is not otherwise directly accessible without EMG guidance.    PLAN: I will have him  follow up on a PRN basis. It is anticipated that repeat injections will be required over the long term.

## 2020-09-15 NOTE — LETTER
9/15/2020       RE: Rodrigo Gann  [address] [facility] [address]     Dear Colleague,    Thank you for referring your patient, Rodrigo Gann, to the [facility] EAR NOSE AND THROAT at [facility]. Please see a copy of my visit note below.    Rodrigo Gann is a 60 year old male with a history of adductor laryngeal dystonia and laryngeal spasm.  he was last injected on 6/23/2020 . he had a good response to the last injection. The last dose given was 0.75 units into each thyroarytenoid muscle.  After the injection  he had a breathy dysphonia for 2 weeks.There was no Dysphagia or Dyspnea.  The response lasted for 2.5 months.   Our plan is to give 0.75 units of Botulinum A toxin into  each thyroarytenoid muscle today.      PROCEDURE: After obtaining consent, the patient was placed in the supine position. Ground and reference electrodes were applied. The anterior neck was cleaned with an alcohol swab.  Using a 27-gauge, unipolar electromyography needle, the larynx was entered through the cricothyroid space.  0.75 units of botulinum A toxin was injected into each thyroarytenoid muscle.  There was a Strong EMG response to phonation on the left side and a Strong EMG response to phonation on the right side.  The total amount of botulinum A toxin delivered today was 1.5 units. An additional 5 units of botulinum A toxin was necessarily wasted in preparation for the injection. he tolerated the procedure well and left the clinic after a short observation period.         The EMG was necessary specifically in this case to identify areas of muscle overactivity as well as to access the thyroarytenoid muscle which is beneath the thyroid cartilage and is not otherwise directly accessible without EMG guidance.    PLAN: I will have him  follow up on a PRN basis. It is anticipated that repeat injections will be required over the long term.      Sincerely,    Kameron Mcarthur MD

## 2020-09-15 NOTE — PATIENT INSTRUCTIONS
1.  You were seen in the ENT Clinic today by . If you have any questions or concerns after your appointment, please call [phone]. Press option #1 for scheduling related needs. Press option #3 for Nurse advice.    2. Plan is to return to clinic as needed for repeat botox treatment      Brandie Domínguez LPN  [facility] - Otolaryngology

## 2020-12-20 ENCOUNTER — HEALTH MAINTENANCE LETTER (OUTPATIENT)
Age: 60
End: 2020-12-20

## 2020-12-22 ENCOUNTER — OFFICE VISIT (OUTPATIENT)
Dept: OTOLARYNGOLOGY | Facility: CLINIC | Age: 60
End: 2020-12-22
Payer: COMMERCIAL

## 2020-12-22 VITALS — TEMPERATURE: 98.4 F | OXYGEN SATURATION: 98 % | BODY MASS INDEX: 26.88 KG/M2 | HEART RATE: 74 BPM | WEIGHT: 182 LBS

## 2020-12-22 DIAGNOSIS — J38.3 OTHER DISEASES OF VOCAL CORDS: ICD-10-CM

## 2020-12-22 DIAGNOSIS — J38.5 LARYNGEAL SPASM: Primary | ICD-10-CM

## 2020-12-22 PROCEDURE — 64617 CHEMODENER MUSCLE LARYNX EMG: CPT | Mod: 50 | Performed by: OTOLARYNGOLOGY

## 2020-12-22 PROCEDURE — 99207 PR NO CHARGE LOS: CPT | Performed by: OTOLARYNGOLOGY

## 2020-12-22 NOTE — NURSING NOTE
Chief Complaint   Patient presents with     RECHECK     return botox       Pulse 74, temperature 98.4  F (36.9  C), temperature source Temporal, weight 82.6 kg (182 lb), SpO2 98 %.    Jailene Prather, EMT

## 2020-12-22 NOTE — LETTER
12/22/2020       RE: Rodrigo Gann  [address] [address] [facility] [address] [address]     Dear Colleague,    Thank you for referring your patient, Rodrigo Gann, to the [facility] EAR NOSE AND THROAT CLINIC [place] at [facility]. Please see a copy of my visit note below.    Rodrigo Gann is a 60 year old male with a history of adductor laryngeal dystonia and laryngeal spasm.  he was last injected on 9/15/2020. he had a good response to the last injection. The last dose given was 0.75 units into each thyroarytenoid muscle.  After the injection  he had a breathy dysphonia for 2 weeks.There was no Dysphagia or Dyspnea.  The response lasted for 3 months.   Our plan is to give 0.75 units of Botulinum A toxin into  each thyroarytenoid muscle today.      PROCEDURE: After obtaining consent, the patient was placed in the supine position. Ground and reference electrodes were applied. The anterior neck was cleaned with an alcohol swab.  Using a 27-gauge, unipolar electromyography needle, the larynx was entered through the cricothyroid space.  0.75 units of botulinum A toxin was injected into each thyroarytenoid muscle.  There was a Strong EMG response to phonation on the left side and a Strong EMG response to phonation on the right side.  The total amount of botulinum A toxin delivered today was 1.5 units. An additional 5 units of botulinum A toxin was necessarily wasted in preparation for the injection. he tolerated the procedure well and left the clinic after a short observation period.         The EMG was necessary specifically in this case to identify areas of muscle overactivity as well as to access the thyroarytenoid muscle which is beneath the thyroid cartilage and is not otherwise directly accessible without EMG guidance.    PLAN: I will have him  follow up on a PRN basis. It is anticipated that repeat injections will be required over the long term.      Again, thank you for  allowing me to participate in the care of your patient.      Sincerely,    Kameron Mcarthur MD

## 2020-12-22 NOTE — PROGRESS NOTES
Rodrigo Gann is a 60 year old male with a history of adductor laryngeal dystonia and laryngeal spasm.  he was last injected on 9/15/2020. he had a good response to the last injection. The last dose given was 0.75 units into each thyroarytenoid muscle.  After the injection  he had a breathy dysphonia for 2 weeks.There was no Dysphagia or Dyspnea.  The response lasted for 3 months.   Our plan is to give 0.75 units of Botulinum A toxin into  each thyroarytenoid muscle today.      PROCEDURE: After obtaining consent, the patient was placed in the supine position. Ground and reference electrodes were applied. The anterior neck was cleaned with an alcohol swab.  Using a 27-gauge, unipolar electromyography needle, the larynx was entered through the cricothyroid space.  0.75 units of botulinum A toxin was injected into each thyroarytenoid muscle.  There was a Strong EMG response to phonation on the left side and a Strong EMG response to phonation on the right side.  The total amount of botulinum A toxin delivered today was 1.5 units. An additional 5 units of botulinum A toxin was necessarily wasted in preparation for the injection. he tolerated the procedure well and left the clinic after a short observation period.         The EMG was necessary specifically in this case to identify areas of muscle overactivity as well as to access the thyroarytenoid muscle which is beneath the thyroid cartilage and is not otherwise directly accessible without EMG guidance.    PLAN: I will have him  follow up on a PRN basis. It is anticipated that repeat injections will be required over the long term.

## 2020-12-22 NOTE — PATIENT INSTRUCTIONS
1.  You were seen in the ENT Clinic today by . If you have any questions or concerns after your appointment, please call [phone]. Press option #1 for scheduling related needs. Press option #3 for Nurse advice.    2.  Plan is to return to clinic as needed for repeat botox treatment      Brandie Domínguez LPN  [facility] - Otolaryngology

## 2021-03-23 ENCOUNTER — OFFICE VISIT (OUTPATIENT)
Dept: OTOLARYNGOLOGY | Facility: CLINIC | Age: 61
End: 2021-03-23
Payer: COMMERCIAL

## 2021-03-23 VITALS — BODY MASS INDEX: 26.52 KG/M2 | WEIGHT: 175 LBS | HEIGHT: 68 IN | TEMPERATURE: 97.5 F

## 2021-03-23 DIAGNOSIS — J38.3 OTHER DISEASES OF VOCAL CORDS: ICD-10-CM

## 2021-03-23 DIAGNOSIS — J38.5 LARYNGEAL SPASM: Primary | ICD-10-CM

## 2021-03-23 PROCEDURE — 64617 CHEMODENER MUSCLE LARYNX EMG: CPT | Mod: 50 | Performed by: OTOLARYNGOLOGY

## 2021-03-23 PROCEDURE — 99207 PR NO CHARGE LOS: CPT | Performed by: OTOLARYNGOLOGY

## 2021-03-23 ASSESSMENT — PAIN SCALES - GENERAL: PAINLEVEL: NO PAIN (0)

## 2021-03-23 ASSESSMENT — MIFFLIN-ST. JEOR: SCORE: 1583.29

## 2021-03-23 NOTE — NURSING NOTE
Invasive Procedure Safety Checklist  Procedure:  Botox injection    Responsible person(s):  Complete sections as appropriate and electronically sign and date below.    Staff/Provider  Consent documentation on chart:  YES  H&P is not applicable (when straight local anesthesia is used).    Procedure Team  Completed by comparing informed consent documentation, information on the patient record and/or the marked surgical site, and discussion with the patient/guardian.     Verified:  (Select all that apply)  Patient identification (two indicators)  Procedure to be performed  Procedure site and /or laterality and/or level  Consent  Procedure site:  Site can not be marked due to location.  Provider Veras - Site/Laterality/Level:  No Level or Structure  Staff/Provider:  No images    Procedure Team:  *Pause for the Cause* verbal and active participation of team members- verify:  Patient name:  YES  Procedure to be performed:  YES  Site, laterality and level, noting patient position:  YES    Above steps completed as applicable (Electronic Signature, Title, Date):    Drea Erwin RN on 3/23/2021 at 8:20 AM      Note:  Any incidents of wrong patient, wrong procedure, or wrong site are reported using the Occurrence Process already in place.  The occurrence form is required to be completed immediately with this type of event.

## 2021-03-23 NOTE — LETTER
3/23/2021       RE: Rodrigo Gann  [address] [address] [id number]     Dear Colleague,    Thank you for referring your patient, Rodrigo Gann, to the [facility] EAR NOSE AND THROAT CLINIC [place] at [facility]. Please see a copy of my visit note below.    Rodrigo Gann is a 60 year old male with a history of adductor laryngeal dystonia and laryngeal spasm.  he was last injected on 12/22/2020. he had a good response to the last injection. The last dose given was 0.75 units into each thyroarytenoid muscle.  After the injection  he had a breathy dysphonia for 2 weeks.There was no Dysphagia or Dyspnea.  The response lasted for 3 months.   Our plan is to give 0.75 units of Botulinum A toxin into  each thyroarytenoid muscle today.      PROCEDURE: After obtaining consent, the patient was placed in the supine position. Ground and reference electrodes were applied. The anterior neck was cleaned with an alcohol swab.  Using a 27-gauge, unipolar electromyography needle, the larynx was entered through the cricothyroid space.  0.75 units of botulinum A toxin was injected into each thyroarytenoid muscle.  There was a Strong EMG response to phonation on the left side and a Strong EMG response to phonation on the right side.  The total amount of botulinum A toxin delivered today was 1.5 units. An additional 5 units of botulinum A toxin was necessarily wasted in preparation for the injection. he tolerated the procedure well and left the clinic after a short observation period.         The EMG was necessary specifically in this case to identify areas of muscle overactivity as well as to access the thyroarytenoid muscle which is beneath the thyroid cartilage and is not otherwise directly accessible without EMG guidance.    PLAN: I will have him  follow up on a PRN basis. It is anticipated that repeat injections will be required over the long term.        Again,  thank you for allowing me to participate in the care of your patient.      Sincerely,    Kameron Mcarthur MD

## 2021-03-23 NOTE — PATIENT INSTRUCTIONS
1.  You were seen in the ENT Clinic today by Dr. Mcarthur.    2.  Plan is to return to clinic as needed for repeat botox injection.    If you have any questions or concerns after your appointment, please call the clinic .   - Clinic phone: [phone]. Press option #1 for scheduling related needs. Press option #3 for Nurse advice.  - Direct phone: [phone]      Drea Erwin RN, BSN  [phone]  [facility]  Department of Otolaryngology  [facility]  [email or web address]

## 2021-03-23 NOTE — PROGRESS NOTES
Rodrigo Gann is a 60 year old male with a history of adductor laryngeal dystonia and laryngeal spasm.  he was last injected on 12/22/2020. he had a good response to the last injection. The last dose given was 0.75 units into each thyroarytenoid muscle.  After the injection  he had a breathy dysphonia for 2 weeks.There was no Dysphagia or Dyspnea.  The response lasted for 3 months.   Our plan is to give 0.75 units of Botulinum A toxin into  each thyroarytenoid muscle today.      PROCEDURE: After obtaining consent, the patient was placed in the supine position. Ground and reference electrodes were applied. The anterior neck was cleaned with an alcohol swab.  Using a 27-gauge, unipolar electromyography needle, the larynx was entered through the cricothyroid space.  0.75 units of botulinum A toxin was injected into each thyroarytenoid muscle.  There was a Strong EMG response to phonation on the left side and a Strong EMG response to phonation on the right side.  The total amount of botulinum A toxin delivered today was 1.5 units. An additional 5 units of botulinum A toxin was necessarily wasted in preparation for the injection. he tolerated the procedure well and left the clinic after a short observation period.         The EMG was necessary specifically in this case to identify areas of muscle overactivity as well as to access the thyroarytenoid muscle which is beneath the thyroid cartilage and is not otherwise directly accessible without EMG guidance.    PLAN: I will have him  follow up on a PRN basis. It is anticipated that repeat injections will be required over the long term.

## 2021-04-24 ENCOUNTER — HEALTH MAINTENANCE LETTER (OUTPATIENT)
Age: 61
End: 2021-04-24

## 2021-06-09 DIAGNOSIS — J38.5 LARYNGEAL SPASM: Primary | ICD-10-CM

## 2021-06-22 ENCOUNTER — OFFICE VISIT (OUTPATIENT)
Dept: OTOLARYNGOLOGY | Facility: CLINIC | Age: 61
End: 2021-06-22
Payer: COMMERCIAL

## 2021-06-22 DIAGNOSIS — J38.3 OTHER DISEASES OF VOCAL CORDS: ICD-10-CM

## 2021-06-22 DIAGNOSIS — J38.5 LARYNGEAL SPASM: Primary | ICD-10-CM

## 2021-06-22 PROCEDURE — 64617 CHEMODENER MUSCLE LARYNX EMG: CPT | Mod: 50 | Performed by: OTOLARYNGOLOGY

## 2021-06-22 PROCEDURE — 99207 PR NO CHARGE LOS: CPT | Performed by: OTOLARYNGOLOGY

## 2021-06-22 NOTE — PATIENT INSTRUCTIONS
1.  You were seen in the ENT Clinic today by Dr. Mcarthur.     2.  Plan is to return to clinic as needed for repeat botox injection.    If you have any questions or concerns after your appointment, please call the clinic.   - Clinic phone: [phone]. Press option #1 for scheduling related needs. Press option #3 for Nurse advice.  - Direct phone: [phone]      Drea Erwin RN, BSN  [phone]  [facility]  Department of Otolaryngology  [facility]  [email or web address]

## 2021-06-22 NOTE — NURSING NOTE
Invasive Procedure Safety Checklist  Procedure:  Botox injection into the vocal folds    Responsible person(s):  Complete sections as appropriate and electronically sign and date below.    Staff/Provider  Consent documentation on chart:  YES  H&P is not applicable (when straight local anesthesia is used).    Procedure Team  Completed by comparing informed consent documentation, information on the patient record and/or the marked surgical site, and discussion with the patient/guardian.     Verified:  (Select all that apply)  Patient identification (two indicators)  Procedure to be performed  Procedure site and /or laterality and/or level  Consent  Procedure site:  Site can not be marked due to location.  Provider Veras - Site/Laterality/Level:  No Level or Structure  Staff/Provider:  No images    Procedure Team:  *Pause for the Cause* verbal and active participation of team members- verify:  Patient name:  YES  Procedure to be performed:  YES  Site, laterality and level, noting patient position:  YES    Above steps completed as applicable (Electronic Signature, Title, Date):    Drea Erwin RN on 6/22/2021 at 9:49 AM      Note:  Any incidents of wrong patient, wrong procedure, or wrong site are reported using the Occurrence Process already in place.  The occurrence form is required to be completed immediately with this type of event.

## 2021-06-22 NOTE — LETTER
6/22/2021       RE: Rodrigo Gann  [address] [id number]     Dear Colleague,    Thank you for referring your patient, Rodrigo Gann, to the [facility] EAR NOSE AND THROAT CLINIC [place] at [facility]. Please see a copy of my visit note below.    Rodrigo Gann is a 61 year old male with a history of adductor laryngeal dystonia and laryngeal spasm.  he was last injected on 3/23/2021. he had a fair response to the last injection. The last dose given was 0.75 units into each thyroarytenoid muscle.  After the injection  he had a breathy dysphonia for 2 weeks.There was no Dysphagia or Dyspnea.  The response lasted for 2 months.   Our plan is to give 0.75 units of Botulinum A toxin into  each thyroarytenoid muscle today.      PROCEDURE: After obtaining consent, the patient was placed in the supine position. Ground and reference electrodes were applied. The anterior neck was cleaned with an alcohol swab.  Using a 27-gauge, unipolar electromyography needle, the larynx was entered through the cricothyroid space.  0.75 units of botulinum A toxin was injected into each thyroarytenoid muscle.  There was a Moderate EMG response to phonation on the left side and a Moderate EMG response to phonation on the right side.  The total amount of botulinum A toxin delivered today was 1.5 units. An additional 5 units of botulinum A toxin was necessarily wasted in preparation for the injection. he tolerated the procedure well and left the clinic after a short observation period.         The EMG was necessary specifically in this case to identify areas of muscle overactivity as well as to access the thyroarytenoid muscle which is beneath the thyroid cartilage and is not otherwise directly accessible without EMG guidance.    PLAN: I will have himfollow up on a PRN basis. It is anticipated that repeat injections will be required over the long term.        Again,  thank you for allowing me to participate in the care of your patient.      Sincerely,    Kameron Mcarthur MD

## 2021-06-22 NOTE — NURSING NOTE
Chief Complaint   Patient presents with     RECHECK     Botox       There were no vitals taken for this visit.    Dennis Moreno, EMT

## 2021-06-22 NOTE — PROGRESS NOTES
Rodrigo Gann is a 61 year old male with a history of adductor laryngeal dystonia and laryngeal spasm.  he was last injected on 3/23/2021. he had a fair response to the last injection. The last dose given was 0.75 units into each thyroarytenoid muscle.  After the injection  he had a breathy dysphonia for 2 weeks.There was no Dysphagia or Dyspnea.  The response lasted for 2 months.   Our plan is to give 0.75 units of Botulinum A toxin into  each thyroarytenoid muscle today.      PROCEDURE: After obtaining consent, the patient was placed in the supine position. Ground and reference electrodes were applied. The anterior neck was cleaned with an alcohol swab.  Using a 27-gauge, unipolar electromyography needle, the larynx was entered through the cricothyroid space.  0.75 units of botulinum A toxin was injected into each thyroarytenoid muscle.  There was a Moderate EMG response to phonation on the left side and a Moderate EMG response to phonation on the right side.  The total amount of botulinum A toxin delivered today was 1.5 units. An additional 5 units of botulinum A toxin was necessarily wasted in preparation for the injection. he tolerated the procedure well and left the clinic after a short observation period.         The EMG was necessary specifically in this case to identify areas of muscle overactivity as well as to access the thyroarytenoid muscle which is beneath the thyroid cartilage and is not otherwise directly accessible without EMG guidance.    PLAN: I will have himfollow up on a PRN basis. It is anticipated that repeat injections will be required over the long term.

## 2021-10-03 ENCOUNTER — HEALTH MAINTENANCE LETTER (OUTPATIENT)
Age: 61
End: 2021-10-03

## 2021-10-19 ENCOUNTER — OFFICE VISIT (OUTPATIENT)
Dept: OTOLARYNGOLOGY | Facility: CLINIC | Age: 61
End: 2021-10-19
Payer: COMMERCIAL

## 2021-10-19 DIAGNOSIS — J38.5 LARYNGEAL SPASM: Primary | ICD-10-CM

## 2021-10-19 DIAGNOSIS — J38.3 OTHER DISEASES OF VOCAL CORDS: ICD-10-CM

## 2021-10-19 PROCEDURE — 99207 PR NO CHARGE LOS: CPT | Performed by: OTOLARYNGOLOGY

## 2021-10-19 PROCEDURE — 64617 CHEMODENER MUSCLE LARYNX EMG: CPT | Mod: 50 | Performed by: OTOLARYNGOLOGY

## 2021-10-19 NOTE — PATIENT INSTRUCTIONS
1.  You were seen in the ENT Clinic today by . If you have any questions or concerns after your appointment, please call [phone]. Press option #1 for scheduling related needs. Press option #3 for Nurse advice.    2. Plan is to return to clinic 1/11/22 for repeat botox injection      Brandie Domínguez LPN  [phone]  [facility] - Otolaryngology

## 2021-10-19 NOTE — LETTER
10/19/2021       RE: Rodrigo Gann  [address] [id number]     Dear Colleague,    Thank you for referring your patient, Rodrigo Gann, to the [facility] EAR NOSE AND THROAT CLINIC [place] at [facility]. Please see a copy of my visit note below.    Rodrigo Gann is a 61 year old male with a history of adductor laryngeal dystonia and laryngeal spasm.  he was last injected on 6/22/2021. he had a good response to the last injection. The last dose given was 0.75 units into each thyroarytenoid muscle.  After the injection  he had a breathy dysphonia for 1 week.There was no Dysphagia or Dyspnea.  The response lasted for 3 months.   Our plan is to give 0.75 units of Botulinum A toxin into  each thyroarytenoid muscle today.      PROCEDURE: After obtaining consent, the patient was placed in the supine position. Ground and reference electrodes were applied. The anterior neck was cleaned with an alcohol swab.  Using a 27-gauge, unipolar electromyography needle, the larynx was entered through the cricothyroid space.  0.75 units of botulinum A toxin was injected into each thyroarytenoid muscle.  There was a Strong EMG response to phonation on the left side and a Weak EMG response to phonation on the right side.  There was a small bruise at the injection site on the left side which was controlled easily.  The total amount of botulinum A toxin delivered today was 1.5 units. An additional 5 units of botulinum A toxin was necessarily wasted in preparation for the injection. he tolerated the procedure well and left the clinic after a short observation period.         The EMG was necessary specifically in this case to identify areas of muscle overactivity as well as to access the thyroarytenoid muscle which is beneath the thyroid cartilage and is not otherwise directly accessible without EMG guidance.    PLAN: I will have him  follow up on a PRN basis. It  is anticipated that repeat injections will be required over the long term.      Again, thank you for allowing me to participate in the care of your patient.      Sincerely,    Kameron Mcarthur MD

## 2021-10-19 NOTE — PROGRESS NOTES
Rodrigo Gann is a 61 year old male with a history of adductor laryngeal dystonia and laryngeal spasm.  he was last injected on 6/22/2021. he had a good response to the last injection. The last dose given was 0.75 units into each thyroarytenoid muscle.  After the injection  he had a breathy dysphonia for 1 week.There was no Dysphagia or Dyspnea.  The response lasted for 3 months.   Our plan is to give 0.75 units of Botulinum A toxin into  each thyroarytenoid muscle today.      PROCEDURE: After obtaining consent, the patient was placed in the supine position. Ground and reference electrodes were applied. The anterior neck was cleaned with an alcohol swab.  Using a 27-gauge, unipolar electromyography needle, the larynx was entered through the cricothyroid space.  0.75 units of botulinum A toxin was injected into each thyroarytenoid muscle.  There was a Strong EMG response to phonation on the left side and a Weak EMG response to phonation on the right side.  There was a small bruise at the injection site on the left side which was controlled easily.  The total amount of botulinum A toxin delivered today was 1.5 units. An additional 5 units of botulinum A toxin was necessarily wasted in preparation for the injection. he tolerated the procedure well and left the clinic after a short observation period.         The EMG was necessary specifically in this case to identify areas of muscle overactivity as well as to access the thyroarytenoid muscle which is beneath the thyroid cartilage and is not otherwise directly accessible without EMG guidance.    PLAN: I will have him  follow up on a PRN basis. It is anticipated that repeat injections will be required over the long term.

## 2021-12-13 ENCOUNTER — TELEPHONE (OUTPATIENT)
Dept: OTOLARYNGOLOGY | Facility: CLINIC | Age: 61
End: 2021-12-13
Payer: COMMERCIAL

## 2021-12-13 NOTE — TELEPHONE ENCOUNTER
[facility] Call Center    Phone Message    May a detailed message be left on voicemail: yes     Reason for Call: Other: Pt called and wants to know if he is receiving the true botox brand of injection so he can qualify for his rebate. Please call Pt to discuss. Thank you.      Action Taken: Message routed to:  [facility] ENT    Travel Screening: Not Applicable

## 2021-12-14 NOTE — TELEPHONE ENCOUNTER
Left vm message for patient in regards to botox used in clinic. botox product information left on vm. Writers call back number provided on vm in case of any further questions.

## 2022-01-11 ENCOUNTER — OFFICE VISIT (OUTPATIENT)
Dept: OTOLARYNGOLOGY | Facility: CLINIC | Age: 62
End: 2022-01-11
Payer: COMMERCIAL

## 2022-01-11 VITALS — WEIGHT: 160 LBS | HEIGHT: 68 IN | BODY MASS INDEX: 24.25 KG/M2

## 2022-01-11 DIAGNOSIS — J38.3 OTHER DISEASES OF VOCAL CORDS: ICD-10-CM

## 2022-01-11 DIAGNOSIS — J38.5 LARYNGEAL SPASM: Primary | ICD-10-CM

## 2022-01-11 PROCEDURE — 64617 CHEMODENER MUSCLE LARYNX EMG: CPT | Mod: 50 | Performed by: OTOLARYNGOLOGY

## 2022-01-11 ASSESSMENT — MIFFLIN-ST. JEOR: SCORE: 1510.26

## 2022-01-11 ASSESSMENT — PAIN SCALES - GENERAL: PAINLEVEL: NO PAIN (0)

## 2022-01-11 NOTE — LETTER
Date:January 12, 2022      Patient was self referred, no letter generated. Do not send.        [facility] Health Information

## 2022-01-11 NOTE — PATIENT INSTRUCTIONS
1.  You were seen in the ENT Clinic today by . If you have any questions or concerns after your appointment, please call [phone]. Press option #1 for scheduling related needs. Press option #3 for Nurse advice.    2.  Plan is to return to clinic 3/22/22 for repeat botox injection      Brandie Domínguez LPN  [phone]  [facility] - Otolaryngology

## 2022-01-11 NOTE — PROGRESS NOTES
Rodrigo Gann is a 61 year old male with a history of adductor laryngeal dystonia and laryngeal spasm.  he was last injected on 10/19/2021. he had a good response to the last injection. The last dose given was 0.75 units into each thyroarytenoid muscle.  After the injection  he had a breathy dysphonia for 0.5 weeks.There was no Dysphagia or Dyspnea.  The response lasted for 3 months.   Our plan is to give 0.75 units of Botulinum A toxin into  each thyroarytenoid muscle today.      PROCEDURE: After obtaining consent, the patient was placed in the supine position. Ground and reference electrodes were applied. The anterior neck was cleaned with an alcohol swab.  Using a 27-gauge, unipolar electromyography needle, the larynx was entered through the cricothyroid space.  0.75 units of botulinum A toxin was injected into each thyroarytenoid muscle.  There was a Strong EMG response to phonation on the left side and a Moderate EMG response to phonation on the right side.  The total amount of botulinum A toxin delivered today was 1.5 units. An additional 5 units of botulinum A toxin was necessarily wasted in preparation for the injection. he tolerated the procedure well and left the clinic after a short observation period.         The EMG was necessary specifically in this case to identify areas of muscle overactivity as well as to access the thyroarytenoid muscle which is beneath the thyroid cartilage and is not otherwise directly accessible without EMG guidance.    PLAN: I will have him  follow up on a PRN basis. It is anticipated that repeat injections will be required over the long term.

## 2022-01-11 NOTE — LETTER
1/11/2022       RE: Rodrigo Gann  [address] [id number]     Dear Colleague,    Thank you for referring your patient, Rodrigo Gann, to the [facility] EAR NOSE AND THROAT CLINIC [place] at [facility]. Please see a copy of my visit note below.    Rodrigo Gann is a 61 year old male with a history of adductor laryngeal dystonia and laryngeal spasm.  he was last injected on 10/19/2021. he had a good response to the last injection. The last dose given was 0.75 units into each thyroarytenoid muscle.  After the injection  he had a breathy dysphonia for 0.5 weeks.There was no Dysphagia or Dyspnea.  The response lasted for 3 months.   Our plan is to give 0.75 units of Botulinum A toxin into  each thyroarytenoid muscle today.      PROCEDURE: After obtaining consent, the patient was placed in the supine position. Ground and reference electrodes were applied. The anterior neck was cleaned with an alcohol swab.  Using a 27-gauge, unipolar electromyography needle, the larynx was entered through the cricothyroid space.  0.75 units of botulinum A toxin was injected into each thyroarytenoid muscle.  There was a Strong EMG response to phonation on the left side and a Moderate EMG response to phonation on the right side.  The total amount of botulinum A toxin delivered today was 1.5 units. An additional 5 units of botulinum A toxin was necessarily wasted in preparation for the injection. he tolerated the procedure well and left the clinic after a short observation period.         The EMG was necessary specifically in this case to identify areas of muscle overactivity as well as to access the thyroarytenoid muscle which is beneath the thyroid cartilage and is not otherwise directly accessible without EMG guidance.    PLAN: I will have him  follow up on a PRN basis. It is anticipated that repeat injections will be required over the long  term.            Again, thank you for allowing me to participate in the care of your patient.      Sincerely,    Kameron Mcarthur MD

## 2022-01-11 NOTE — NURSING NOTE
Invasive Procedure Safety Checklist  Procedure:  botox    Responsible person(s):  Complete sections as appropriate and electronically sign and date below.    Staff/Provider  Consent documentation on chart:  YES  H&P is not applicable    Procedure Team  Completed by comparing informed consent documentation, information on the patient record and/or the marked surgical site, and discussion with the patient/guardian.     Verified:  (Select all that apply)  Patient identification (two indicators)  Procedure to be performed  Procedure site and /or laterality and/or level  Consent  Procedure site:  Site can not be marked due to location.  Provider Veras - Site/Laterality/Level:  No Level or Structure  Staff/Provider:  No images    Procedure Team:  *Pause for the Cause* verbal and active participation of team members- verify:  Patient name:  YES  Procedure to be performed:  YES  Site, laterality and level, noting patient position:  YES    Above steps completed as applicable (Electronic Signature, Title, Date):    Brandie Domínguez LPN      Note:  Any incidents of wrong patient, wrong procedure, or wrong site are reported using the Occurrence Process already in place.  The occurrence form is required to be completed immediately with this type of event.

## 2022-01-11 NOTE — NURSING NOTE
"Chief Complaint   Patient presents with     RECHECK     botox    Height 1.727 m (5' 8\"), weight 72.6 kg (160 lb). Yumiko Medina, EMT  "

## 2022-03-14 ENCOUNTER — TELEPHONE (OUTPATIENT)
Dept: OTOLARYNGOLOGY | Facility: CLINIC | Age: 62
End: 2022-03-14
Payer: COMMERCIAL

## 2022-03-14 NOTE — TELEPHONE ENCOUNTER
Pt left vm message for this writer. Writer spoke to patient regarding concerns he had about dose and whether he could reduce it. Advised pt that provider would most likely not object to reduce dose, if this is what patient thinks will work best for him and is ok with the potential side affects. Pt verbalized understanding of information and appreciative of call.

## 2022-03-22 ENCOUNTER — OFFICE VISIT (OUTPATIENT)
Dept: OTOLARYNGOLOGY | Facility: CLINIC | Age: 62
End: 2022-03-22
Payer: COMMERCIAL

## 2022-03-22 VITALS — HEIGHT: 68 IN | WEIGHT: 160 LBS | BODY MASS INDEX: 24.25 KG/M2

## 2022-03-22 DIAGNOSIS — J38.3 OTHER DISEASES OF VOCAL CORDS: ICD-10-CM

## 2022-03-22 DIAGNOSIS — J38.5 LARYNGEAL SPASM: Primary | ICD-10-CM

## 2022-03-22 PROCEDURE — 64617 CHEMODENER MUSCLE LARYNX EMG: CPT | Mod: 50 | Performed by: OTOLARYNGOLOGY

## 2022-03-22 PROCEDURE — 99207 PR NO CHARGE LOS: CPT | Performed by: OTOLARYNGOLOGY

## 2022-03-22 ASSESSMENT — PAIN SCALES - GENERAL: PAINLEVEL: NO PAIN (0)

## 2022-03-22 NOTE — PROGRESS NOTES
Rodrigo Gann is a 61 year old male with a history of adductor laryngeal dystonia and laryngeal spasm.  he was last injected on 1/11/2022. he had a fair to good response to the last injection. The last dose given was 0.75 units into each thyroarytenoid muscle.  After the injection  he had a breathy dysphonia for 3 weeks.There was no Dysphagia or Dyspnea.  The response lasted for 3 months and he continues to have a fairly good voice today.   Our plan is to give 0.5 units of Botulinum A toxin into  each thyroarytenoid muscle today.  This is a reduction from his previous dose.      PROCEDURE: After obtaining consent, the patient was placed in the supine position. Ground and reference electrodes were applied. The anterior neck was cleaned with an alcohol swab.  Using a 27-gauge, unipolar electromyography needle, the larynx was entered through the cricothyroid space.  0.5 units of botulinum A toxin was injected into each thyroarytenoid muscle.  There was a Weak EMG response to phonation on the left side and a Weak EMG response to phonation on the right side.  The total amount of botulinum A toxin delivered today was 1.0 units. An additional 5 units of botulinum A toxin was necessarily wasted in preparation for the injection. he tolerated the procedure well and left the clinic after a short observation period.         The EMG was necessary specifically in this case to identify areas of muscle overactivity as well as to access the thyroarytenoid muscle which is beneath the thyroid cartilage and is not otherwise directly accessible without EMG guidance.    PLAN: I will have him  follow up on a PRN basis. It is anticipated that repeat injections will be required over the long term.

## 2022-03-22 NOTE — NURSING NOTE
"Chief Complaint   Patient presents with     Procedure     botox    Height 1.727 m (5' 8\"), weight 72.6 kg (160 lb). Yumiko Medina, EMT  "

## 2022-03-22 NOTE — LETTER
3/22/2022       RE: Rodrigo Gann  [address] [facility] [id number]     Dear Colleague,    Thank you for referring your patient, Rodrigo Gann, to the [facility] EAR NOSE AND THROAT CLINIC [place] at [facility]. Please see a copy of my visit note below.    Rodrigo Gann is a 61 year old male with a history of adductor laryngeal dystonia and laryngeal spasm.  he was last injected on 1/11/2022. he had a fair to good response to the last injection. The last dose given was 0.75 units into each thyroarytenoid muscle.  After the injection  he had a breathy dysphonia for 3 weeks.There was no Dysphagia or Dyspnea.  The response lasted for 3 months and he continues to have a fairly good voice today.   Our plan is to give 0.5 units of Botulinum A toxin into  each thyroarytenoid muscle today.  This is a reduction from his previous dose.      PROCEDURE: After obtaining consent, the patient was placed in the supine position. Ground and reference electrodes were applied. The anterior neck was cleaned with an alcohol swab.  Using a 27-gauge, unipolar electromyography needle, the larynx was entered through the cricothyroid space.  0.5 units of botulinum A toxin was injected into each thyroarytenoid muscle.  There was a Weak EMG response to phonation on the left side and a Weak EMG response to phonation on the right side.  The total amount of botulinum A toxin delivered today was 1.0 units. An additional 5 units of botulinum A toxin was necessarily wasted in preparation for the injection. he tolerated the procedure well and left the clinic after a short observation period.         The EMG was necessary specifically in this case to identify areas of muscle overactivity as well as to access the thyroarytenoid muscle which is beneath the thyroid cartilage and is not otherwise directly accessible without EMG guidance.    PLAN: I will have him  follow up on a PRN basis.  It is anticipated that repeat injections will be required over the long term.            Again, thank you for allowing me to participate in the care of your patient.      Sincerely,    Kameron Mcarthur MD

## 2022-03-22 NOTE — LETTER
Date:March 23, 2022      Patient was self referred, no letter generated. Do not send.        [facility] Health Information

## 2022-03-22 NOTE — PATIENT INSTRUCTIONS
1.  You were seen in the ENT Clinic today by . If you have any questions or concerns after your appointment, please call [phone]. Press option #1 for scheduling related needs. Press option #3 for Nurse advice.    2. Plan is to return to clinic 6/14/22 for repeat botox injection      Brandie Domínguez LPN  [phone]  [facility] - Otolaryngology

## 2022-05-15 ENCOUNTER — HEALTH MAINTENANCE LETTER (OUTPATIENT)
Age: 62
End: 2022-05-15

## 2022-05-19 DIAGNOSIS — J38.5 LARYNGEAL SPASM: Primary | ICD-10-CM

## 2022-06-14 ENCOUNTER — OFFICE VISIT (OUTPATIENT)
Dept: OTOLARYNGOLOGY | Facility: CLINIC | Age: 62
End: 2022-06-14
Payer: COMMERCIAL

## 2022-06-14 VITALS — BODY MASS INDEX: 24.25 KG/M2 | HEIGHT: 68 IN | WEIGHT: 160 LBS

## 2022-06-14 DIAGNOSIS — J38.5 LARYNGEAL SPASM: Primary | ICD-10-CM

## 2022-06-14 DIAGNOSIS — J38.3 OTHER DISEASES OF VOCAL CORDS: ICD-10-CM

## 2022-06-14 PROCEDURE — 64617 CHEMODENER MUSCLE LARYNX EMG: CPT | Mod: 50 | Performed by: OTOLARYNGOLOGY

## 2022-06-14 PROCEDURE — 99207 PR NO CHARGE LOS: CPT | Performed by: OTOLARYNGOLOGY

## 2022-06-14 ASSESSMENT — PAIN SCALES - GENERAL: PAINLEVEL: NO PAIN (0)

## 2022-06-14 NOTE — PROGRESS NOTES
Rodrigo Gann is a 61 year old male with a history of adductor laryngeal dystonia and laryngeal spasm.  he was last injected on 3/22/2022. he had a fair to good response to the last injection. The last dose given was 0.5 units into each thyroarytenoid muscle. This was a reduction from his previous dose of 0.75 units.  This dose was reduced due to a prolonged breathy period of 3 weeks.  After the injection  he had a breathy dysphonia for 0.5 weeks.There was no Dysphagia or Dyspnea.  The response lasted for 2 months.  He would like to go back to the 0.75 unit dosage today. Our plan is to give 0.75 units of Botulinum A toxin into  each thyroarytenoid muscle today.      PROCEDURE: After obtaining consent, the patient was placed in the supine position. Ground and reference electrodes were applied. The anterior neck was cleaned with an alcohol swab.  Using a 27-gauge, unipolar electromyography needle, the larynx was entered through the cricothyroid space.  0.75 units of botulinum A toxin was injected into each thyroarytenoid muscle.  There was a Strong EMG response to phonation on the left side and a Moderate EMG response to phonation on the right side.  The total amount of botulinum A toxin delivered today was 1.5 units. An additional 5 units of botulinum A toxin was necessarily wasted in preparation for the injection. he tolerated the procedure well and left the clinic after a short observation period.   He does have some lightheadedness when he sits up after the injection but this resolves after few minutes.    The EMG was necessary specifically in this case to identify areas of muscle overactivity as well as to access the thyroarytenoid muscle which is beneath the thyroid cartilage and is not otherwise directly accessible without EMG guidance.    PLAN: I will have him  follow up on a PRN basis. It is anticipated that repeat injections will be required over the long term.

## 2022-06-14 NOTE — LETTER
6/14/2022       RE: Rodrigo Gann  [address] [facility] [address]     Dear Colleague,    Thank you for referring your patient, Rodrigo Gann, to the [facility] EAR NOSE AND THROAT CLINIC [place] at [facility]. Please see a copy of my visit note below.    Rodrigo Gann is a 61 year old male with a history of adductor laryngeal dystonia and laryngeal spasm.  he was last injected on 3/22/2022. he had a fair to good response to the last injection. The last dose given was 0.5 units into each thyroarytenoid muscle. This was a reduction from his previous dose of 0.75 units.  This dose was reduced due to a prolonged breathy period of 3 weeks.  After the injection  he had a breathy dysphonia for 0.5 weeks.There was no Dysphagia or Dyspnea.  The response lasted for 2 months.  He would like to go back to the 0.75 unit dosage today. Our plan is to give 0.75 units of Botulinum A toxin into  each thyroarytenoid muscle today.      PROCEDURE: After obtaining consent, the patient was placed in the supine position. Ground and reference electrodes were applied. The anterior neck was cleaned with an alcohol swab.  Using a 27-gauge, unipolar electromyography needle, the larynx was entered through the cricothyroid space.  0.75 units of botulinum A toxin was injected into each thyroarytenoid muscle.  There was a Strong EMG response to phonation on the left side and a Moderate EMG response to phonation on the right side.  The total amount of botulinum A toxin delivered today was 1.5 units. An additional 5 units of botulinum A toxin was necessarily wasted in preparation for the injection. he tolerated the procedure well and left the clinic after a short observation period.   He does have some lightheadedness when he sits up after the injection but this resolves after few minutes.    The EMG was necessary specifically in this case to identify areas of muscle overactivity  [place]... as well as to access the thyroarytenoid muscle which is beneath the thyroid cartilage and is not otherwise directly accessible without EMG guidance.    PLAN: I will have him  follow up on a PRN basis. It is anticipated that repeat injections will be required over the long term.            Again, thank you for allowing me to participate in the care of your patient.      Sincerely,    Kameron Mcarthur MD

## 2022-06-14 NOTE — LETTER
Date:Ryann 15, 2022      Patient was self referred, no letter generated. Do not send.        [facility] Health Information

## 2022-06-16 NOTE — PATIENT INSTRUCTIONS
1.  You were seen in the ENT Clinic today by . If you have any questions or concerns after your appointment, please call [phone]. Press option #1 for scheduling related needs. Press option #3 for Nurse advice.    2.  Plan is to return to clinic as scheduled      Brandie Domínguez LPN  [phone]  [facility] Otolaryngology

## 2022-06-17 ENCOUNTER — TELEPHONE (OUTPATIENT)
Dept: OTOLARYNGOLOGY | Facility: CLINIC | Age: 62
End: 2022-06-17
Payer: COMMERCIAL

## 2022-06-17 NOTE — TELEPHONE ENCOUNTER
Left vm message for patient in regards to scheduling follow up botox appt.  Writers call back number provided on vm.

## 2022-09-06 ENCOUNTER — OFFICE VISIT (OUTPATIENT)
Dept: OTOLARYNGOLOGY | Facility: CLINIC | Age: 62
End: 2022-09-06
Payer: COMMERCIAL

## 2022-09-06 VITALS — BODY MASS INDEX: 24.25 KG/M2 | HEIGHT: 68 IN | WEIGHT: 160 LBS

## 2022-09-06 DIAGNOSIS — J38.5 LARYNGEAL SPASM: Primary | ICD-10-CM

## 2022-09-06 DIAGNOSIS — J38.3 OTHER DISEASES OF VOCAL CORDS: ICD-10-CM

## 2022-09-06 PROCEDURE — 99207 PR NO CHARGE LOS: CPT | Performed by: OTOLARYNGOLOGY

## 2022-09-06 PROCEDURE — 64617 CHEMODENER MUSCLE LARYNX EMG: CPT | Mod: 50 | Performed by: OTOLARYNGOLOGY

## 2022-09-06 ASSESSMENT — PAIN SCALES - GENERAL: PAINLEVEL: NO PAIN (0)

## 2022-09-06 NOTE — PATIENT INSTRUCTIONS
1.  You were seen in the ENT Clinic today by . If you have any questions or concerns after your appointment, please call [phone]. Press option #1 for scheduling related needs. Press option #3 for Nurse advice.    2.  Plan is to return to clinic 12/6/22 for repeat botox injection      Brandie Domínguez LPN  [phone]  [facility] - Otolaryngology

## 2022-09-06 NOTE — PROGRESS NOTES
Rodrigo Gann is a 62 year old male with a history of adductor laryngeal dystonia and laryngeal spasm.  he was last injected on 6/14/2022. he had a good response to the last injection. The last dose given was 0.75  units into each thyroarytenoid muscle.  We tried a lower dose of 0.5 units to reduce his breathy period 2 visits ago but this shortened the response.  After the injection  he had a breathy dysphonia for 1 week.There was no Dysphagia or Dyspnea.  The response lasted for 2.5 months.   Our plan is to give 0.75 units of Botulinum A toxin into  each thyroarytenoid muscle today.      PROCEDURE: After obtaining consent, the patient was placed in the supine position. Ground and reference electrodes were applied. The anterior neck was cleaned with an alcohol swab.  Using a 27-gauge, unipolar electromyography needle, the larynx was entered through the cricothyroid space.  0.75 units of botulinum A toxin was injected into each thyroarytenoid muscle.  There was a Strong EMG response to phonation on the left side and a Moderate EMG response to phonation on the right side.  The total amount of botulinum A toxin delivered today was 1.5 units. An additional 5 units of botulinum A toxin was necessarily wasted in preparation for the injection. he tolerated the procedure well and left the clinic after a short observation period.         The EMG was necessary specifically in this case to identify areas of muscle overactivity as well as to access the thyroarytenoid muscle which is beneath the thyroid cartilage and is not otherwise directly accessible without EMG guidance.    PLAN: I will have him  follow up on a PRN basis. It is anticipated that repeat injections will be required over the long term.

## 2022-09-06 NOTE — LETTER
Date:September 7, 2022      Patient was self referred, no letter generated. Do not send.        [facility] Health Information

## 2022-09-06 NOTE — LETTER
9/6/2022       RE: Rodrigo Gann  [address] [facility] [address]     Dear Colleague,    Thank you for referring your patient, Rodrigo Gann, to the [facility] EAR NOSE AND THROAT CLINIC [place] at [facility]. Please see a copy of my visit note below.    Rodrigo Gann is a 62 year old male with a history of adductor laryngeal dystonia and laryngeal spasm.  he was last injected on 6/14/2022. he had a good response to the last injection. The last dose given was 0.75  units into each thyroarytenoid muscle.  We tried a lower dose of 0.5 units to reduce his breathy period 2 visits ago but this shortened the response.  After the injection  he had a breathy dysphonia for 1 week.There was no Dysphagia or Dyspnea.  The response lasted for 2.5 months.   Our plan is to give 0.75 units of Botulinum A toxin into  each thyroarytenoid muscle today.      PROCEDURE: After obtaining consent, the patient was placed in the supine position. Ground and reference electrodes were applied. The anterior neck was cleaned with an alcohol swab.  Using a 27-gauge, unipolar electromyography needle, the larynx was entered through the cricothyroid space.  0.75 units of botulinum A toxin was injected into each thyroarytenoid muscle.  There was a Strong EMG response to phonation on the left side and a Moderate EMG response to phonation on the right side.  The total amount of botulinum A toxin delivered today was 1.5 units. An additional 5 units of botulinum A toxin was necessarily wasted in preparation for the injection. he tolerated the procedure well and left the clinic after a short observation period.         The EMG was necessary specifically in this case to identify areas of muscle overactivity as well as to access the thyroarytenoid muscle which is beneath the thyroid cartilage and is not otherwise directly accessible without EMG guidance.    PLAN: I will have him  follow up  on a PRN basis. It is anticipated that repeat injections will be required over the long term.            Again, thank you for allowing me to participate in the care of your patient.      Sincerely,    Kameron Mcarthur MD

## 2022-09-10 ENCOUNTER — HEALTH MAINTENANCE LETTER (OUTPATIENT)
Age: 62
End: 2022-09-10

## 2022-12-06 ENCOUNTER — OFFICE VISIT (OUTPATIENT)
Dept: OTOLARYNGOLOGY | Facility: CLINIC | Age: 62
End: 2022-12-06
Payer: COMMERCIAL

## 2022-12-06 VITALS — WEIGHT: 160 LBS | BODY MASS INDEX: 24.25 KG/M2 | HEIGHT: 68 IN

## 2022-12-06 DIAGNOSIS — J38.5 LARYNGEAL SPASM: Primary | ICD-10-CM

## 2022-12-06 DIAGNOSIS — J38.3 OTHER DISEASES OF VOCAL CORDS: ICD-10-CM

## 2022-12-06 PROCEDURE — 64617 CHEMODENER MUSCLE LARYNX EMG: CPT | Mod: 50 | Performed by: OTOLARYNGOLOGY

## 2022-12-06 PROCEDURE — 99207 PR NO CHARGE LOS: CPT | Performed by: OTOLARYNGOLOGY

## 2022-12-06 ASSESSMENT — PAIN SCALES - GENERAL: PAINLEVEL: NO PAIN (0)

## 2022-12-06 NOTE — PATIENT INSTRUCTIONS
1.  You were seen in the ENT Clinic today by . If you have any questions or concerns after your appointment, please call [phone]. Press option #1 for scheduling related needs. Press option #3 for Nurse advice.    2.  Plan is to return to clinic 3/7/23 for repeat botox injection      Brandie Domínguez LPN  [phone]  [facility] - Otolaryngology

## 2022-12-06 NOTE — LETTER
Date:December 7, 2022      Patient was self referred, no letter generated. Do not send.        [facility] Health Information

## 2022-12-06 NOTE — PROGRESS NOTES
Rodrigo Gann is a 62 year old male with a history of adductor laryngeal dystonia and laryngeal spasm.  he was last injected on 9/6/2022. he had a good response to the last injection. The last dose given was 0.75 units into each thyroarytenoid muscle.  We have tried a lower dose to reduce his breathy period in the past but this shortened the response.  After the injection  he had a breathy dysphonia for 1.5 weeks.There was no Dysphagia or Dyspnea.  The response lasted for 2.5 months.   Our plan is to give 0.75 units of Botulinum A toxin into  each thyroarytenoid muscle today.      PROCEDURE: After obtaining consent, the patient was placed in the supine position. Ground and reference electrodes were applied. The anterior neck was cleaned with an alcohol swab.  Using a 27-gauge, unipolar electromyography needle, the larynx was entered through the cricothyroid space.  0.75 units of botulinum A toxin was injected into each thyroarytenoid muscle.  There was a Strong EMG response to phonation on the left side and a Strong EMG response to phonation on the right side.  The total amount of botulinum A toxin delivered today was 1.5 units. An additional 5 units of botulinum A toxin was necessarily wasted in preparation for the injection. he tolerated the procedure well and left the clinic after a short observation period.   He did have some mild lightheadedness after the injection that lasted only for a minute or two.      The EMG was necessary specifically in this case to identify areas of muscle overactivity as well as to access the thyroarytenoid muscle which is beneath the thyroid cartilage and is not otherwise directly accessible without EMG guidance.    PLAN: I will have him  follow up on a PRN basis. It is anticipated that repeat injections will be required over the long term.

## 2022-12-06 NOTE — LETTER
12/6/2022       RE: Rodrigo Gann  [address] [facility] [address]     Dear Colleague,    Thank you for referring your patient, Rodrigo Gann, to the [facility] EAR NOSE AND THROAT CLINIC [place] at [facility]. Please see a copy of my visit note below.    Rodrigo Gann is a 62 year old male with a history of adductor laryngeal dystonia and laryngeal spasm.  he was last injected on 9/6/2022. he had a good response to the last injection. The last dose given was 0.75 units into each thyroarytenoid muscle.  We have tried a lower dose to reduce his breathy period in the past but this shortened the response.  After the injection  he had a breathy dysphonia for 1.5 weeks.There was no Dysphagia or Dyspnea.  The response lasted for 2.5 months.   Our plan is to give 0.75 units of Botulinum A toxin into  each thyroarytenoid muscle today.      PROCEDURE: After obtaining consent, the patient was placed in the supine position. Ground and reference electrodes were applied. The anterior neck was cleaned with an alcohol swab.  Using a 27-gauge, unipolar electromyography needle, the larynx was entered through the cricothyroid space.  0.75 units of botulinum A toxin was injected into each thyroarytenoid muscle.  There was a Strong EMG response to phonation on the left side and a Strong EMG response to phonation on the right side.  The total amount of botulinum A toxin delivered today was 1.5 units. An additional 5 units of botulinum A toxin was necessarily wasted in preparation for the injection. he tolerated the procedure well and left the clinic after a short observation period.   He did have some mild lightheadedness after the injection that lasted only for a minute or two.      The EMG was necessary specifically in this case to identify areas of muscle overactivity as well as to access the thyroarytenoid muscle which is beneath the thyroid cartilage and is not  otherwise directly accessible without EMG guidance.    PLAN: I will have him  follow up on a PRN basis. It is anticipated that repeat injections will be required over the long term.            Again, thank you for allowing me to participate in the care of your patient.      Sincerely,    Kameron Mcarthur MD

## 2023-01-21 ENCOUNTER — HEALTH MAINTENANCE LETTER (OUTPATIENT)
Age: 63
End: 2023-01-21

## 2023-03-07 ENCOUNTER — OFFICE VISIT (OUTPATIENT)
Dept: OTOLARYNGOLOGY | Facility: CLINIC | Age: 63
End: 2023-03-07
Payer: COMMERCIAL

## 2023-03-07 VITALS — HEIGHT: 68 IN | BODY MASS INDEX: 24.25 KG/M2 | WEIGHT: 160 LBS

## 2023-03-07 DIAGNOSIS — J38.3 OTHER DISEASES OF VOCAL CORDS: ICD-10-CM

## 2023-03-07 DIAGNOSIS — J38.5 LARYNGEAL SPASM: Primary | ICD-10-CM

## 2023-03-07 PROCEDURE — 99207 PR NO CHARGE LOS: CPT | Performed by: OTOLARYNGOLOGY

## 2023-03-07 PROCEDURE — 64617 CHEMODENER MUSCLE LARYNX EMG: CPT | Mod: 50 | Performed by: OTOLARYNGOLOGY

## 2023-03-07 ASSESSMENT — PAIN SCALES - GENERAL: PAINLEVEL: NO PAIN (0)

## 2023-03-07 NOTE — LETTER
Date:March 8, 2023      Patient was self referred, no letter generated. Do not send.        [facility] Health Information

## 2023-03-07 NOTE — PATIENT INSTRUCTIONS
1.  You were seen in the ENT Clinic today by . If you have any questions or concerns after your appointment, please call [phone]. Press option #1 for scheduling related needs. Press option #3 for Nurse advice.    2. Plan is to return to clinic 5/30/23 for repeat botox injection      Brandie Domínguez LPN  [phone]  [facility] - Otolaryngology

## 2023-03-07 NOTE — LETTER
3/7/2023       RE: Rodrigo Gann  [address] [facility] [address]     Dear Colleague,    Thank you for referring your patient, Rodrigo Gann, to the [facility] EAR NOSE AND THROAT CLINIC [place] at [facility]. Please see a copy of my visit note below.    Rodrigo Gann is a 62 year old male with a history of adductor laryngeal dystonia and laryngeal spasm.  he was last injected on 12/6/2022. he had a good response to the last injection. The last dose given was 0.75 units into each thyroarytenoid muscle.  We have tried a lower dose to reduce his breathy period in the past (3/22/2022). This was successful but this shortened the response.  After the injection  he had a breathy dysphonia for 1 week. There was no Dysphagia or Dyspnea.  The response lasted for 2.5 months.   Our plan is to give 0.75 units of Botulinum A toxin into  each thyroarytenoid muscle today.      PROCEDURE: After obtaining consent, the patient was placed in the supine position. Ground and reference electrodes were applied. The anterior neck was cleaned with an alcohol swab.  Using a 27-gauge, unipolar electromyography needle, the larynx was entered through the cricothyroid space.  0.75 units of botulinum A toxin was injected into each thyroarytenoid muscle.  There was a Strong EMG response to phonation on the left side and a Strong EMG response to phonation on the right side.  The total amount of botulinum A toxin delivered today was 1.5 units. An additional 5 units of botulinum A toxin was necessarily wasted in preparation for the injection. he tolerated the procedure well and left the clinic after a short observation period.         The EMG was necessary specifically in this case to identify areas of muscle overactivity as well as to access the thyroarytenoid muscle which is beneath the thyroid cartilage and is not otherwise directly accessible without EMG guidance.    PLAN: I  will have her  follow up on a PRN basis. It is anticipated that repeat injections will be required over the long term.            Again, thank you for allowing me to participate in the care of your patient.      Sincerely,    Kameron Mcarthur MD

## 2023-03-07 NOTE — PROGRESS NOTES
Rodrigo Gann is a 62 year old male with a history of adductor laryngeal dystonia and laryngeal spasm.  he was last injected on 12/6/2022. he had a good response to the last injection. The last dose given was 0.75 units into each thyroarytenoid muscle.  We have tried a lower dose to reduce his breathy period in the past (3/22/2022). This was successful but this shortened the response.  After the injection  he had a breathy dysphonia for 1 week. There was no Dysphagia or Dyspnea.  The response lasted for 2.5 months.   Our plan is to give 0.75 units of Botulinum A toxin into  each thyroarytenoid muscle today.      PROCEDURE: After obtaining consent, the patient was placed in the supine position. Ground and reference electrodes were applied. The anterior neck was cleaned with an alcohol swab.  Using a 27-gauge, unipolar electromyography needle, the larynx was entered through the cricothyroid space.  0.75 units of botulinum A toxin was injected into each thyroarytenoid muscle.  There was a Strong EMG response to phonation on the left side and a Strong EMG response to phonation on the right side.  The total amount of botulinum A toxin delivered today was 1.5 units. An additional 5 units of botulinum A toxin was necessarily wasted in preparation for the injection. he tolerated the procedure well and left the clinic after a short observation period.         The EMG was necessary specifically in this case to identify areas of muscle overactivity as well as to access the thyroarytenoid muscle which is beneath the thyroid cartilage and is not otherwise directly accessible without EMG guidance.    PLAN: I will have her  follow up on a PRN basis. It is anticipated that repeat injections will be required over the long term.

## 2023-04-24 DIAGNOSIS — J38.5 LARYNGEAL SPASM: Primary | ICD-10-CM

## 2023-05-30 ENCOUNTER — OFFICE VISIT (OUTPATIENT)
Dept: OTOLARYNGOLOGY | Facility: CLINIC | Age: 63
End: 2023-05-30
Payer: COMMERCIAL

## 2023-05-30 VITALS — WEIGHT: 156 LBS | BODY MASS INDEX: 23.11 KG/M2 | HEIGHT: 69 IN

## 2023-05-30 DIAGNOSIS — J38.3 OTHER DISEASES OF VOCAL CORDS: ICD-10-CM

## 2023-05-30 DIAGNOSIS — J38.5 LARYNGEAL SPASM: Primary | ICD-10-CM

## 2023-05-30 PROCEDURE — 64617 CHEMODENER MUSCLE LARYNX EMG: CPT | Mod: 50 | Performed by: OTOLARYNGOLOGY

## 2023-05-30 ASSESSMENT — PAIN SCALES - GENERAL: PAINLEVEL: NO PAIN (0)

## 2023-05-30 NOTE — PROGRESS NOTES
Rodrigo Gann is a 62 year old male with a history of adductor laryngeal dystonia and laryngeal spasm.  he was last injected on 3/7/2023. he had a fair to good response to the last injection. The last dose given was 0.75 units into each thyroarytenoid muscle. We have tried a lower dose of 0.5 units to reduce his breathy period in the past (3/22/2022). This was successful but this shortened the response. After the injection  he had a breathy dysphonia for 1 week.There was no Dysphagia or Dyspnea.  The response lasted for 2.5 months.   Our plan is to give 0.75 units of Botulinum A toxin into  each thyroarytenoid muscle today.      PROCEDURE: After obtaining consent, the patient was placed in the supine position. Ground and reference electrodes were applied. The anterior neck was cleaned with an alcohol swab.  Using a 27-gauge, unipolar electromyography needle, the larynx was entered through the cricothyroid space.  0.75 units of botulinum A toxin was injected into each thyroarytenoid muscle.  There was a Strong EMG response to phonation on the left side and a Strong EMG response to phonation on the right side.  The total amount of botulinum A toxin delivered today was 1.5 units. An additional 5 units of botulinum A toxin was necessarily wasted in preparation for the injection. he tolerated the procedure well and left the clinic after a short observation period.         The EMG was necessary specifically in this case to identify areas of muscle overactivity as well as to access the thyroarytenoid muscle which is beneath the thyroid cartilage and is not otherwise directly accessible without EMG guidance.    PLAN: I will have him  follow up on a PRN basis. It is anticipated that repeat injections will be required over the long term.  We did discuss the possibility of a trial of speech therapy but he has had this in the past prior to Botox injections and would like to just do the exercises he learned back then.

## 2023-05-30 NOTE — PATIENT INSTRUCTIONS
1.  You were seen in the ENT Clinic today by . If you have any questions or concerns after your appointment, please call [phone]. Press option #1 for scheduling related needs. Press option #3 for Nurse advice.    2. Plan is to return to clinic 8/29/23 for repeat botox injection      Brandie Domínguez LPN  [phone]  [facility] - Otolaryngology

## 2023-08-16 ENCOUNTER — OFFICE VISIT (OUTPATIENT)
Dept: OTOLARYNGOLOGY | Facility: CLINIC | Age: 63
End: 2023-08-16
Payer: COMMERCIAL

## 2023-08-16 DIAGNOSIS — Z00.6 EXAMINATION OF PARTICIPANT OR CONTROL IN CLINICAL RESEARCH: Primary | ICD-10-CM

## 2023-08-16 PROCEDURE — 99207 PR NO CHARGE LOS: CPT

## 2023-08-16 NOTE — PROGRESS NOTES
This individual participated in a research visit (IRB#[id number]) as a patient with laryngeal dystonia. This was his first visit. Informed consent for participation was completed prior to today's visit. Following audio recording of stimulus sentences, lidocaine HCL 2% with Oxymetazoline HCL 0.025% was administered to the right and left nostril. A high-resolution manometry catheter was passed transasally to the proximal esophagus. Placement was confirmed via visual inspection of the real-time spatiotemporal plot of pressure data. The participant completed the tasks and the catheter was removed. No known complications occurred in association with this procedure.    Research coordinator contact: [phone].   PI contact: [phone]    Christian Rosenbaum, Ph.D., CCC-SLP  , Otolaryngology  Speech-Language Pathologist-[facility]  [phone]  he/him/his

## 2023-08-29 ENCOUNTER — OFFICE VISIT (OUTPATIENT)
Dept: OTOLARYNGOLOGY | Facility: CLINIC | Age: 63
End: 2023-08-29
Payer: COMMERCIAL

## 2023-08-29 VITALS — HEIGHT: 67 IN | BODY MASS INDEX: 24.33 KG/M2 | WEIGHT: 155 LBS

## 2023-08-29 DIAGNOSIS — J38.5 LARYNGEAL SPASM: Primary | ICD-10-CM

## 2023-08-29 DIAGNOSIS — J38.3 OTHER DISEASES OF VOCAL CORDS: ICD-10-CM

## 2023-08-29 PROCEDURE — 64617 CHEMODENER MUSCLE LARYNX EMG: CPT | Mod: 50 | Performed by: OTOLARYNGOLOGY

## 2023-08-29 ASSESSMENT — PAIN SCALES - GENERAL: PAINLEVEL: NO PAIN (0)

## 2023-08-29 NOTE — LETTER
8/29/2023       RE: oRdrigo Gann  [address] [address] [facility] [address]     Dear Colleague,    Thank you for referring your patient, Rodrigo Gann, to the [facility] EAR NOSE AND THROAT CLINIC [place] at [facility]. Please see a copy of my visit note below.    Rodrigo Gann is a 63 year old male with a history of adductor laryngeal dystonia and laryngeal spasm.  he was last injected on 5/30/2023. he had a fair to good response to the last injection. The last dose given was 0.75 units into each thyroarytenoid muscle.  We have tried a lower dose of 0.5 units to reduce his breathy period in the past (3/22/2022). This was successful but this shortened the response.  After the injection  he had a breathy dysphonia for 1 week.There was no Dysphagia or Dyspnea.  The response lasted for 2 months.   Our plan is to give 0.85 units of Botulinum A toxin into  each thyroarytenoid muscle today.  This is a slight increase in his dosage.      PROCEDURE: After obtaining consent, the patient was placed in the supine position. Ground and reference electrodes were applied. The anterior neck was cleaned with an alcohol swab.  Using a 27-gauge, unipolar electromyography needle, the larynx was entered through the cricothyroid space.  0.85 units of botulinum A toxin was injected into each thyroarytenoid muscle.  There was a Strong EMG response to phonation on the left side and a Strong EMG response to phonation on the right side.  The total amount of botulinum A toxin delivered today was 1.7 units. An additional 5 units of botulinum A toxin was necessarily wasted in preparation for the injection. he tolerated the procedure well and left the clinic after a short observation period.         The EMG was necessary specifically in this case to identify areas of muscle overactivity as well as to access the thyroarytenoid muscle which is beneath the thyroid cartilage and is not  otherwise directly accessible without EMG guidance.    PLAN: I will have him  follow up on a PRN basis. It is anticipated that repeat injections will be required over the long term.        Again, thank you for allowing me to participate in the care of your patient.      Sincerely,    Kameron Mcarthur MD

## 2023-08-29 NOTE — NURSING NOTE
Invasive Procedure Safety Checklist  Procedure:  Botox Injection    Responsible person(s):  Complete sections as appropriate and electronically sign and date below.    Staff/Provider  Consent documentation on chart:  YES  H&P is not applicable (when straight local anesthesia is used).    Procedure Team  Completed by comparing informed consent documentation, information on the patient record and/or the marked surgical site, and discussion with the patient/guardian.     Verified:  (Select all that apply)  Patient identification (two indicators)  Procedure to be performed  Procedure site and /or laterality and/or level  Consent  Procedure site:  Site can not be marked due to location.  Provider Veras - Site/Laterality/Level:  No Level or Structure  Staff/Provider:  No images    Procedure Team:  *Pause for the Cause* verbal and active participation of team members- verify:  Patient name:  YES  Procedure to be performed:  YES  Site, laterality and level, noting patient position:  YES    Above steps completed as applicable (Electronic Signature, Title, Date):    SLIME TIRADO LPN on 8/29/2023 at 10:53 AM      Note:  Any incidents of wrong patient, wrong procedure, or wrong site are reported using the Occurrence Process already in place.  The occurrence form is required to be completed immediately with this type of event.

## 2023-08-29 NOTE — PATIENT INSTRUCTIONS
1.  You were seen in the ENT Clinic today by . If you have any questions or concerns after your appointment, please call [phone]. Press option #1 for scheduling related needs. Press option #3 for Nurse advice.     2. Plan is to return to clinic November 21st for repeat botox injection.        Brandie Domínguez LPN  [phone]  [facility] - Otolaryngology

## 2023-08-29 NOTE — NURSING NOTE
"Chief Complaint   Patient presents with    Procedure     Botox injections     Height 1.702 m (5' 7\"), weight 70.3 kg (155 lb).  Carmelo Solis LPN    "

## 2023-08-29 NOTE — PROGRESS NOTES
Rodrigo Gann is a 63 year old male with a history of adductor laryngeal dystonia and laryngeal spasm.  he was last injected on 5/30/2023. he had a fair to good response to the last injection. The last dose given was 0.75 units into each thyroarytenoid muscle.  We have tried a lower dose of 0.5 units to reduce his breathy period in the past (3/22/2022). This was successful but this shortened the response.  After the injection  he had a breathy dysphonia for 1 week.There was no Dysphagia or Dyspnea.  The response lasted for 2 months.   Our plan is to give 0.85 units of Botulinum A toxin into  each thyroarytenoid muscle today.  This is a slight increase in his dosage.      PROCEDURE: After obtaining consent, the patient was placed in the supine position. Ground and reference electrodes were applied. The anterior neck was cleaned with an alcohol swab.  Using a 27-gauge, unipolar electromyography needle, the larynx was entered through the cricothyroid space.  0.85 units of botulinum A toxin was injected into each thyroarytenoid muscle.  There was a Strong EMG response to phonation on the left side and a Strong EMG response to phonation on the right side.  The total amount of botulinum A toxin delivered today was 1.7 units. An additional 5 units of botulinum A toxin was necessarily wasted in preparation for the injection. he tolerated the procedure well and left the clinic after a short observation period.         The EMG was necessary specifically in this case to identify areas of muscle overactivity as well as to access the thyroarytenoid muscle which is beneath the thyroid cartilage and is not otherwise directly accessible without EMG guidance.    PLAN: I will have him  follow up on a PRN basis. It is anticipated that repeat injections will be required over the long term.

## 2023-11-21 ENCOUNTER — OFFICE VISIT (OUTPATIENT)
Dept: OTOLARYNGOLOGY | Facility: CLINIC | Age: 63
End: 2023-11-21
Payer: COMMERCIAL

## 2023-11-21 VITALS — WEIGHT: 152 LBS | BODY MASS INDEX: 23.04 KG/M2 | HEIGHT: 68 IN

## 2023-11-21 DIAGNOSIS — J38.5 LARYNGEAL SPASM: Primary | ICD-10-CM

## 2023-11-21 DIAGNOSIS — J38.3 OTHER DISEASES OF VOCAL CORDS: ICD-10-CM

## 2023-11-21 PROCEDURE — 64617 CHEMODENER MUSCLE LARYNX EMG: CPT | Mod: 50 | Performed by: OTOLARYNGOLOGY

## 2023-11-21 ASSESSMENT — PAIN SCALES - GENERAL: PAINLEVEL: NO PAIN (0)

## 2023-11-21 NOTE — PATIENT INSTRUCTIONS
1.  You were seen in the ENT Clinic today by . If you have any questions or concerns after your appointment, please call [phone]. Press option #1 for scheduling related needs. Press option #3 for Nurse advice.    2. Plan is to return to clinic 2/20/24 for repeat botox injection      Brandie Domínguez LPN  [phone]  [facility] - Otolaryngology

## 2023-11-21 NOTE — LETTER
11/21/2023       RE: Rodrigo Gann  [address] [facility] [address]     Dear Colleague,    Thank you for referring your patient, Rodrigo Gann, to the [facility] EAR NOSE AND THROAT CLINIC [place] at [facility]. Please see a copy of my visit note below.    Rodrigo Gann is a 63 year old male with a history of adductor laryngeal dystonia and laryngeal spasm.  he was last injected on 8/29/2023. he had a fair to good response to the last injection. The last dose given was 0.85 units into each thyroarytenoid muscle.  This was a slight increase over the 0.75 units that have been given on 5/30/2023.  We had tried a lower dose of 0.5 units to reduce his breathy.  In the past this was successful but shortages response.  After the injection  he had a breathy dysphonia for 4 weeks.There was no Dysphagia or Dyspnea.  The response lasted for 2.5 months.   Our plan is to give 0.75 units of Botulinum A toxin into  each thyroarytenoid muscle today.  This is a return to his prior dosage .      PROCEDURE: After obtaining consent, the patient was placed in the supine position. Ground and reference electrodes were applied. The anterior neck was cleaned with an alcohol swab.  Using a 27-gauge, unipolar electromyography needle, the larynx was entered through the cricothyroid space.  0.75 units of botulinum A toxin was injected into each thyroarytenoid muscle.  There was a Moderate EMG response to phonation on the left side and a Strong EMG response to phonation on the right side.  The total amount of botulinum A toxin delivered today was 1.5 units. An additional 5 units of botulinum A toxin was necessarily wasted in preparation for the injection. he tolerated the procedure well and left the clinic after a short observation period.         The EMG was necessary specifically in this case to identify areas of muscle overactivity as well as to access the thyroarytenoid  muscle which is beneath the thyroid cartilage and is not otherwise directly accessible without EMG guidance.    PLAN: I will have him  follow up on a PRN basis. It is anticipated that repeat injections will be required over the long term.        Again, thank you for allowing me to participate in the care of your patient.      Sincerely,    Kameron Mcarthur MD

## 2023-11-21 NOTE — PROGRESS NOTES
Rodrigo Gann is a 63 year old male with a history of adductor laryngeal dystonia and laryngeal spasm.  he was last injected on 8/29/2023. he had a fair to good response to the last injection. The last dose given was 0.85 units into each thyroarytenoid muscle.  This was a slight increase over the 0.75 units that have been given on 5/30/2023.  We had tried a lower dose of 0.5 units to reduce his breathy.  In the past this was successful but shortages response.  After the injection  he had a breathy dysphonia for 4 weeks.There was no Dysphagia or Dyspnea.  The response lasted for 2.5 months.   Our plan is to give 0.75 units of Botulinum A toxin into  each thyroarytenoid muscle today.  This is a return to his prior dosage .      PROCEDURE: After obtaining consent, the patient was placed in the supine position. Ground and reference electrodes were applied. The anterior neck was cleaned with an alcohol swab.  Using a 27-gauge, unipolar electromyography needle, the larynx was entered through the cricothyroid space.  0.75 units of botulinum A toxin was injected into each thyroarytenoid muscle.  There was a Moderate EMG response to phonation on the left side and a Strong EMG response to phonation on the right side.  The total amount of botulinum A toxin delivered today was 1.5 units. An additional 5 units of botulinum A toxin was necessarily wasted in preparation for the injection. he tolerated the procedure well and left the clinic after a short observation period.         The EMG was necessary specifically in this case to identify areas of muscle overactivity as well as to access the thyroarytenoid muscle which is beneath the thyroid cartilage and is not otherwise directly accessible without EMG guidance.    PLAN: I will have him  follow up on a PRN basis. It is anticipated that repeat injections will be required over the long term.

## 2024-02-18 ENCOUNTER — HEALTH MAINTENANCE LETTER (OUTPATIENT)
Age: 64
End: 2024-02-18

## 2024-02-20 ENCOUNTER — OFFICE VISIT (OUTPATIENT)
Dept: OTOLARYNGOLOGY | Facility: CLINIC | Age: 64
End: 2024-02-20
Payer: COMMERCIAL

## 2024-02-20 VITALS — HEIGHT: 68 IN | WEIGHT: 152 LBS | BODY MASS INDEX: 23.04 KG/M2

## 2024-02-20 DIAGNOSIS — J38.5 LARYNGEAL SPASM: Primary | ICD-10-CM

## 2024-02-20 DIAGNOSIS — J38.3 OTHER DISEASES OF VOCAL CORDS: ICD-10-CM

## 2024-02-20 PROCEDURE — 64617 CHEMODENER MUSCLE LARYNX EMG: CPT | Mod: 50 | Performed by: OTOLARYNGOLOGY

## 2024-02-20 NOTE — PROGRESS NOTES
Rodrigo Gann is a 63 year old male with a history of adductor laryngeal dystonia and laryngeal spasm.  he was last injected on 11/21/2023. he had a good response to the last injection. The last dose given was 0.75 units into each thyroarytenoid muscle.  On 8/29/2023 his dose was increased to 0.85 units.  This was a slight increase over the 0.75 units that have been given on 5/30/2023.  We have tried a lower dose of 0.5 units to reduce his breathiness.  In the past this was successful but was associated with a shorter response.  We eventually returned to the 0.75 units on 11/21/2023.  After the injection  he had a breathy dysphonia for 5 weeks.There was no Dysphagia or Dyspnea.  The response lasted for 3 months.   Our plan is to give 0.75 units of Botulinum A toxin into  each thyroarytenoid muscle today.      PROCEDURE: After obtaining consent, the patient was placed in the supine position. Ground and reference electrodes were applied. The anterior neck was cleaned with an alcohol swab.  Using a 27-gauge, unipolar electromyography needle, the larynx was entered through the cricothyroid space.  0.75 units of botulinum A toxin was injected into each thyroarytenoid muscle.  There was a Moderate EMG response to phonation on the left side and a Strong EMG response to phonation on the right side.  The total amount of botulinum A toxin delivered today was 1.5 units. An additional 5 units of botulinum A toxin was necessarily wasted in preparation for the injection. he tolerated the procedure well and left the clinic after a short observation period.         The EMG was necessary specifically in this case to identify areas of muscle overactivity as well as to access the thyroarytenoid muscle which is beneath the thyroid cartilage and is not otherwise directly accessible without EMG guidance.    PLAN: I will have him  follow up on a PRN basis. It is anticipated that repeat injections will be required over the long  term.

## 2024-02-20 NOTE — PATIENT INSTRUCTIONS
1.  You were seen in the ENT Clinic today by . If you have any questions or concerns after your appointment, please call [phone]. Press option #1 for scheduling related needs. Press option #3 for Nurse advice.    2. Plan is to return to clinic 5/21/24 for repeat botox injection      Brandie Domínguez LPN  [phone]  [facility] - Otolaryngology

## 2024-02-20 NOTE — LETTER
2/20/2024       RE: Rodrigo Gann  [address] [facility] [address]     Dear Colleague,    Thank you for referring your patient, Rodrigo Gann, to the [facility] EAR NOSE AND THROAT CLINIC [place] at [facility]. Please see a copy of my visit note below.    Rodrigo Gann is a 63 year old male with a history of adductor laryngeal dystonia and laryngeal spasm.  he was last injected on 11/21/2023. he had a good response to the last injection. The last dose given was 0.75 units into each thyroarytenoid muscle.  On 8/29/2023 his dose was increased to 0.85 units.  This was a slight increase over the 0.75 units that have been given on 5/30/2023.  We have tried a lower dose of 0.5 units to reduce his breathiness.  In the past this was successful but was associated with a shorter response.  We eventually returned to the 0.75 units on 11/21/2023.  After the injection  he had a breathy dysphonia for 5 weeks.There was no Dysphagia or Dyspnea.  The response lasted for 3 months.   Our plan is to give 0.75 units of Botulinum A toxin into  each thyroarytenoid muscle today.      PROCEDURE: After obtaining consent, the patient was placed in the supine position. Ground and reference electrodes were applied. The anterior neck was cleaned with an alcohol swab.  Using a 27-gauge, unipolar electromyography needle, the larynx was entered through the cricothyroid space.  0.75 units of botulinum A toxin was injected into each thyroarytenoid muscle.  There was a Moderate EMG response to phonation on the left side and a Strong EMG response to phonation on the right side.  The total amount of botulinum A toxin delivered today was 1.5 units. An additional 5 units of botulinum A toxin was necessarily wasted in preparation for the injection. he tolerated the procedure well and left the clinic after a short observation period.         The EMG was necessary specifically in this case  to identify areas of muscle overactivity as well as to access the thyroarytenoid muscle which is beneath the thyroid cartilage and is not otherwise directly accessible without EMG guidance.    PLAN: I will have him  follow up on a PRN basis. It is anticipated that repeat injections will be required over the long term.        Again, thank you for allowing me to participate in the care of your patient.      Sincerely,    Kameron Mcarthur MD

## 2024-05-16 DIAGNOSIS — G24.9 DYSTONIA: Primary | ICD-10-CM

## 2024-05-21 ENCOUNTER — OFFICE VISIT (OUTPATIENT)
Dept: OTOLARYNGOLOGY | Facility: CLINIC | Age: 64
End: 2024-05-21
Payer: COMMERCIAL

## 2024-05-21 VITALS — BODY MASS INDEX: 23.04 KG/M2 | HEIGHT: 68 IN | WEIGHT: 152 LBS

## 2024-05-21 DIAGNOSIS — J38.5 LARYNGEAL SPASM: Primary | ICD-10-CM

## 2024-05-21 DIAGNOSIS — J38.3 OTHER DISEASES OF VOCAL CORDS: ICD-10-CM

## 2024-05-21 PROCEDURE — 64617 CHEMODENER MUSCLE LARYNX EMG: CPT | Mod: 50 | Performed by: OTOLARYNGOLOGY

## 2024-05-21 NOTE — PROGRESS NOTES
Rodrigo Gann is a 63 year old male with a history of adductor laryngeal dystonia and laryngeal spasm.  he was last injected on 2/20/2024. he had a good response to the last injection. The last dose given was 0.75 units into each thyroarytenoid muscle.   On 8/29/2023 his dose was increased to 0.85 units.  This was a slight increase over the 0.75 units that have been given on 5/30/2023.  We have tried a lower dose of 0.5 units to reduce his breathiness.  In the past this was successful but was associated with a shorter response.  We eventually returned to the 0.75 units on 11/21/2023.  After the injection  he had a breathy dysphonia for 2 weeks.There was no Dysphagia or Dyspnea.  The response lasted for 3 months.   Our plan is to give 0.75 units of Botulinum A toxin into  each thyroarytenoid muscle today.      PROCEDURE: After obtaining consent, the patient was placed in the supine position. Ground and reference electrodes were applied. The anterior neck was cleaned with an alcohol swab.  Using a 27-gauge, unipolar electromyography needle, the larynx was entered through the cricothyroid space.  0.75 units of botulinum A toxin was injected into each thyroarytenoid muscle.  There was a Strong EMG response to phonation on the left side and a Moderate EMG response to phonation on the right side.  The total amount of botulinum A toxin delivered today was 1.5 units. An additional 98.5 units of botulinum A toxin was necessarily wasted in preparation for the injection. he tolerated the procedure well and left the clinic after a short observation period.         The EMG was necessary specifically in this case to identify areas of muscle overactivity as well as to access the thyroarytenoid muscle which is beneath the thyroid cartilage and is not otherwise directly accessible without EMG guidance.    PLAN: I will have him  follow up on a PRN basis. It is anticipated that repeat injections will be required over the long  term.

## 2024-05-21 NOTE — PATIENT INSTRUCTIONS
1.  You were seen in the ENT Clinic today by . If you have any questions or concerns after your appointment, please call [phone]. Press option #1 for scheduling related needs. Press option #3 for Nurse advice.    2. Plan is to return to clinic 8/20/24 for repeat botox injection      Brandie Domínguez LPN  [phone]  [facility] - Otolaryngology

## 2024-05-21 NOTE — LETTER
5/21/2024       RE: Rodrigo Gann  [address] [address] [facility] [address]     Dear Colleague,    Thank you for referring your patient, Rodrigo Gann, to the [facility] EAR NOSE AND THROAT CLINIC [place] at [facility]. Please see a copy of my visit note below.    Rodrigo Gann is a 63 year old male with a history of adductor laryngeal dystonia and laryngeal spasm.  he was last injected on 2/20/2024. he had a good response to the last injection. The last dose given was 0.75 units into each thyroarytenoid muscle.   On 8/29/2023 his dose was increased to 0.85 units.  This was a slight increase over the 0.75 units that have been given on 5/30/2023.  We have tried a lower dose of 0.5 units to reduce his breathiness.  In the past this was successful but was associated with a shorter response.  We eventually returned to the 0.75 units on 11/21/2023.  After the injection  he had a breathy dysphonia for 2 weeks.There was no Dysphagia or Dyspnea.  The response lasted for 3 months.   Our plan is to give 0.75 units of Botulinum A toxin into  each thyroarytenoid muscle today.      PROCEDURE: After obtaining consent, the patient was placed in the supine position. Ground and reference electrodes were applied. The anterior neck was cleaned with an alcohol swab.  Using a 27-gauge, unipolar electromyography needle, the larynx was entered through the cricothyroid space.  0.75 units of botulinum A toxin was injected into each thyroarytenoid muscle.  There was a Strong EMG response to phonation on the left side and a Moderate EMG response to phonation on the right side.  The total amount of botulinum A toxin delivered today was 1.5 units. An additional 98.5 units of botulinum A toxin was necessarily wasted in preparation for the injection. he tolerated the procedure well and left the clinic after a short observation period.         The EMG was necessary specifically in this  case to identify areas of muscle overactivity as well as to access the thyroarytenoid muscle which is beneath the thyroid cartilage and is not otherwise directly accessible without EMG guidance.    PLAN: I will have him  follow up on a PRN basis. It is anticipated that repeat injections will be required over the long term.        Again, thank you for allowing me to participate in the care of your patient.      Sincerely,    Kameron Mcarthur MD

## 2024-08-20 ENCOUNTER — OFFICE VISIT (OUTPATIENT)
Dept: OTOLARYNGOLOGY | Facility: CLINIC | Age: 64
End: 2024-08-20
Payer: COMMERCIAL

## 2024-08-20 VITALS — BODY MASS INDEX: 22.73 KG/M2 | HEIGHT: 68 IN | WEIGHT: 150 LBS

## 2024-08-20 DIAGNOSIS — J38.3 OTHER DISEASES OF VOCAL CORDS: ICD-10-CM

## 2024-08-20 DIAGNOSIS — J38.5 LARYNGEAL SPASM: Primary | ICD-10-CM

## 2024-08-20 PROCEDURE — 64617 CHEMODENER MUSCLE LARYNX EMG: CPT | Mod: 50 | Performed by: OTOLARYNGOLOGY

## 2024-08-20 NOTE — PROGRESS NOTES
Rodrigo Gann is a 64 year old male with a history of adductor laryngeal dystonia and laryngeal spasm.  he was last injected on 5/21/2024. he had a good response to the last injection. The last dose given was 0.75 units into each thyroarytenoid muscle.  After the injection  he had a breathy dysphonia for 1 week.There was no Dysphagia or Dyspnea.  The response lasted for 3 months.   Our plan is to give 0.75 units of Botulinum A toxin into  each thyroarytenoid muscle today.      PROCEDURE: After obtaining consent, the patient was placed in the supine position. Ground and reference electrodes were applied. The anterior neck was cleaned with an alcohol swab.  Using a 27-gauge, unipolar electromyography needle, the larynx was entered through the cricothyroid space.  0.75 units of botulinum A toxin was injected into each thyroarytenoid muscle.  There was a Strong EMG response to phonation on the left side and a Moderate EMG response to phonation on the right side.  The total amount of botulinum A toxin delivered today was 1.5 units. An additional 98.5 units of botulinum A toxin was necessarily wasted in preparation for the injection. he tolerated the procedure well and left the clinic after a short observation period.         The EMG was necessary specifically in this case to identify areas of muscle overactivity as well as to access the thyroarytenoid muscle which is beneath the thyroid cartilage and is not otherwise directly accessible without EMG guidance.    PLAN: I will have him  follow up on a PRN basis. It is anticipated that repeat injections will be required over the long term.

## 2024-08-20 NOTE — NURSING NOTE
Invasive Procedure Safety Checklist  Procedure:  Botox    Responsible person(s):  Complete sections as appropriate and electronically sign and date below.    Staff/Provider  Consent documentation on chart:  YES  H&P is not applicable (when straight local anesthesia is used).    Procedure Team  Completed by comparing informed consent documentation, information on the patient record and/or the marked surgical site, and discussion with the patient/guardian.     Verified:  (Select all that apply)  Patient identification (two indicators)  Procedure to be performed  Procedure site and /or laterality and/or level  Consent  Procedure site:  Site can not be marked due to location.  Provider Veras - Site/Laterality/Level:  No Level or Structure  Staff/Provider:  No images    Procedure Team:  *Pause for the Cause* verbal and active participation of team members- verify:  Patient name:  YES  Procedure to be performed:  YES  Site, laterality and level, noting patient position:  YES    Above steps completed as applicable (Electronic Signature, Title, Date):    SLIME TIRADO LPN on 8/20/2024 at 1:09 PM      Note:  Any incidents of wrong patient, wrong procedure, or wrong site are reported using the Occurrence Process already in place.  The occurrence form is required to be completed immediately with this type of event.

## 2024-08-20 NOTE — PATIENT INSTRUCTIONS
1.  You were seen in the ENT Clinic today by . If you have any questions or concerns after your appointment, please call [phone]. Press option #1 for scheduling related needs. Press option #3 for Nurse advice.     2. Plan is to return to clinic 11/19/2024 at 9:00 am for repeat botox injection.        Brandie Domínguez LPN  [phone]  [facility] - Otolaryngology

## 2024-08-20 NOTE — LETTER
8/20/2024       RE: Rodrigo Gann  [address] [address] [facility] [address]     Dear Colleague,    Thank you for referring your patient, Rodrigo Gann, to the [facility] EAR NOSE AND THROAT CLINIC [place] at [facility]. Please see a copy of my visit note below.    Rodrigo Gann is a 64 year old male with a history of adductor laryngeal dystonia and laryngeal spasm.  he was last injected on 5/21/2024. he had a good response to the last injection. The last dose given was 0.75 units into each thyroarytenoid muscle.  After the injection  he had a breathy dysphonia for 1 week.There was no Dysphagia or Dyspnea.  The response lasted for 3 months.   Our plan is to give 0.75 units of Botulinum A toxin into  each thyroarytenoid muscle today.      PROCEDURE: After obtaining consent, the patient was placed in the supine position. Ground and reference electrodes were applied. The anterior neck was cleaned with an alcohol swab.  Using a 27-gauge, unipolar electromyography needle, the larynx was entered through the cricothyroid space.  0.75 units of botulinum A toxin was injected into each thyroarytenoid muscle.  There was a Strong EMG response to phonation on the left side and a Moderate EMG response to phonation on the right side.  The total amount of botulinum A toxin delivered today was 1.5 units. An additional 98.5 units of botulinum A toxin was necessarily wasted in preparation for the injection. he tolerated the procedure well and left the clinic after a short observation period.         The EMG was necessary specifically in this case to identify areas of muscle overactivity as well as to access the thyroarytenoid muscle which is beneath the thyroid cartilage and is not otherwise directly accessible without EMG guidance.    PLAN: I will have him  follow up on a PRN basis. It is anticipated that repeat injections will be required over the long term.        Again,  thank you for allowing me to participate in the care of your patient.      Sincerely,    Kameron Mcarthur MD

## 2024-11-19 ENCOUNTER — OFFICE VISIT (OUTPATIENT)
Dept: OTOLARYNGOLOGY | Facility: CLINIC | Age: 64
End: 2024-11-19
Payer: COMMERCIAL

## 2024-11-19 DIAGNOSIS — J38.5 LARYNGEAL SPASM: Primary | ICD-10-CM

## 2024-11-19 DIAGNOSIS — J38.3 OTHER DISEASES OF VOCAL CORDS: ICD-10-CM

## 2024-11-19 PROCEDURE — 64617 CHEMODENER MUSCLE LARYNX EMG: CPT | Mod: 50 | Performed by: OTOLARYNGOLOGY

## 2024-11-19 NOTE — PATIENT INSTRUCTIONS
1.  You were seen in the ENT Clinic today by . If you have any questions or concerns after your appointment, please call [phone]. Press option #1 for scheduling related needs. Press option #3 for Nurse advice.     2. Plan is to return to clinic 2/28/2025 for repeat botox injection.        Brandie Domínguez LPN  [phone]  [facility] - Otolaryngology

## 2024-11-19 NOTE — NURSING NOTE
Invasive Procedure Safety Checklist  Procedure:  Botox    Responsible person(s):  Complete sections as appropriate and electronically sign and date below.    Staff/Provider  Consent documentation on chart:  YES  H&P is not applicable (when straight local anesthesia is used).    Procedure Team  Completed by comparing informed consent documentation, information on the patient record and/or the marked surgical site, and discussion with the patient/guardian.     Verified:  (Select all that apply)  Patient identification (two indicators)  Procedure to be performed  Procedure site and /or laterality and/or level  Consent  Procedure site:  Site can not be marked due to location.  Provider Veras - Site/Laterality/Level:  No Level or Structure  Staff/Provider:  No images    Procedure Team:  *Pause for the Cause* verbal and active participation of team members- verify:  Patient name:  YES  Procedure to be performed:  YES  Site, laterality and level, noting patient position:  YES    Above steps completed as applicable (Electronic Signature, Title, Date):    SLIME TIRADO LPN on 11/19/2024 at 9:35 AM      Note:  Any incidents of wrong patient, wrong procedure, or wrong site are reported using the Occurrence Process already in place.  The occurrence form is required to be completed immediately with this type of event.

## 2024-11-19 NOTE — LETTER
11/19/2024       RE: Rodrigo Gann  [address] [address] [facility] [address]     Dear Colleague,    Thank you for referring your patient, Rodrigo Gann, to the [facility] EAR NOSE AND THROAT CLINIC [place] at [facility]. Please see a copy of my visit note below.    Rodrigo Gann is a 64 year old male with a history of adductor laryngeal dystonia and laryngeal spasm.  he was last injected on 8/20/2024. he had a good response to the last injection. The last dose given was 0.75 units into each thyroarytenoid muscle.  After the injection  he had a breathy dysphonia for 2 weeks.There was no Dysphagia or Dyspnea.  The response lasted for 3 months.   Our plan is to give 0.75 units of Botulinum A toxin into  each thyroarytenoid muscle today.      PROCEDURE: After obtaining consent, the patient was placed in the supine position. Ground and reference electrodes were applied. The anterior neck was cleaned with an alcohol swab.  Using a 27-gauge, unipolar electromyography needle, the larynx was entered through the cricothyroid space.  0.75 units of botulinum A toxin was injected into each thyroarytenoid muscle.  There was a Strong EMG response to phonation on the left side and a Strong EMG response to phonation on the right side.  The total amount of botulinum A toxin delivered today was 1.5 units. An additional 98.5 units of botulinum A toxin was necessarily wasted in preparation for the injection. he tolerated the procedure well and left the clinic after a short observation period.         The EMG was necessary specifically in this case to identify areas of muscle overactivity as well as to access the thyroarytenoid muscle which is beneath the thyroid cartilage and is not otherwise directly accessible without EMG guidance.    PLAN: I will have him  follow up on a PRN basis. It is anticipated that repeat injections will be required over the long  term.              Again, thank you for allowing me to participate in the care of your patient.      Sincerely,    Kameron Mcarthur MD

## 2024-11-19 NOTE — PROGRESS NOTES
Rodrigo Gann is a 64 year old male with a history of adductor laryngeal dystonia and laryngeal spasm.  he was last injected on 8/20/2024. he had a good response to the last injection. The last dose given was 0.75 units into each thyroarytenoid muscle.  After the injection  he had a breathy dysphonia for 2 weeks.There was no Dysphagia or Dyspnea.  The response lasted for 3 months.   Our plan is to give 0.75 units of Botulinum A toxin into  each thyroarytenoid muscle today.      PROCEDURE: After obtaining consent, the patient was placed in the supine position. Ground and reference electrodes were applied. The anterior neck was cleaned with an alcohol swab.  Using a 27-gauge, unipolar electromyography needle, the larynx was entered through the cricothyroid space.  0.75 units of botulinum A toxin was injected into each thyroarytenoid muscle.  There was a Strong EMG response to phonation on the left side and a Strong EMG response to phonation on the right side.  The total amount of botulinum A toxin delivered today was 1.5 units. An additional 98.5 units of botulinum A toxin was necessarily wasted in preparation for the injection. he tolerated the procedure well and left the clinic after a short observation period.         The EMG was necessary specifically in this case to identify areas of muscle overactivity as well as to access the thyroarytenoid muscle which is beneath the thyroid cartilage and is not otherwise directly accessible without EMG guidance.    PLAN: I will have him  follow up on a PRN basis. It is anticipated that repeat injections will be required over the long term.

## 2025-01-23 ENCOUNTER — TELEPHONE (OUTPATIENT)
Dept: OTOLARYNGOLOGY | Facility: CLINIC | Age: 65
End: 2025-01-23
Payer: COMMERCIAL

## 2025-01-23 NOTE — TELEPHONE ENCOUNTER
Left Voicemail (1st Attempt) for the patient to call back and schedule the following:    Appointment type: return ent botox  Provider: Lyubov  Return date: next available per patient's preferences (may '25?)    Specialty phone number: writer's direct  Additional appointment(s) needed: n/a  Additional Notes: n/a    Raven Martinez on 1/23/2025 at 12:48 PM

## 2025-01-23 NOTE — TELEPHONE ENCOUNTER
[facility] Call Center    Phone Message    May a detailed message be left on voicemail: yes     Reason for Call: Other: Pt would like to schedule a botox appt in May '25. Pt would prefer an appt around 10:30am. Please call pt back to assist with scheduling     Action Taken: Other: [facility]    Travel Screening: Not Applicable     Date of Service:

## 2025-02-09 ENCOUNTER — HEALTH MAINTENANCE LETTER (OUTPATIENT)
Age: 65
End: 2025-02-09

## 2025-02-18 ENCOUNTER — OFFICE VISIT (OUTPATIENT)
Dept: OTOLARYNGOLOGY | Facility: CLINIC | Age: 65
End: 2025-02-18
Payer: COMMERCIAL

## 2025-02-18 DIAGNOSIS — J38.3 OTHER DISEASES OF VOCAL CORDS: ICD-10-CM

## 2025-02-18 DIAGNOSIS — J38.5 LARYNGEAL SPASM: Primary | ICD-10-CM

## 2025-02-18 PROCEDURE — 64617 CHEMODENER MUSCLE LARYNX EMG: CPT | Mod: 50 | Performed by: OTOLARYNGOLOGY

## 2025-02-18 NOTE — PATIENT INSTRUCTIONS
1.  You were seen in the ENT Clinic today by . If you have any questions or concerns after your appointment, please call [phone]. Press option #1 for scheduling related needs. Press option #3 for Nurse advice.    2. Plan is to return to clinic 5/20/25 for repeat botox injection      Brandie Domínguez LPN  [phone]  [facility] - Otolaryngology

## 2025-02-18 NOTE — LETTER
2/18/2025       RE: Rodrigo Gann  [address] [facility] [address]     Dear Colleague,    Thank you for referring your patient, Rodrigo Gann, to the [facility] EAR NOSE AND THROAT CLINIC [place] at [facility]. Please see a copy of my visit note below.    Rodrigo Gann is a 64 year old male with a history of adductor laryngeal dystonia and laryngeal spasm.  he was last injected on 11/19/2024. he had a good response to the last injection. The last dose given was 0.75 units into each thyroarytenoid muscle.  After the injection  he had a breathy dysphonia for 0.5 weeks.There was no Dysphagia or Dyspnea.  The response lasted for 3 months.   Our plan is to give 0.75 units of Botulinum A toxin into  each thyroarytenoid muscle today.    PROCEDURE: After obtaining consent, the patient was placed in the supine position. Ground and reference electrodes were applied. The anterior neck was cleaned with an alcohol swab.  Using a 27-gauge, unipolar electromyography needle, the larynx was entered through the cricothyroid space.  0.75 units of botulinum A toxin was injected into each thyroarytenoid muscle.  There was a Moderate EMG response to phonation on the left side and a Moderate EMG response to phonation on the right side.  The total amount of botulinum A toxin delivered today was 1.5 units. An additional 98.5 units of botulinum A toxin was necessarily wasted in preparation for the injection. he tolerated the procedure well and left the clinic after a short observation period.       The EMG was necessary specifically in this case to identify areas of muscle overactivity as well as to access the thyroarytenoid muscle which is beneath the thyroid cartilage and is not otherwise directly accessible without EMG guidance.    PLAN: I will have him  follow up on a PRN basis. It is anticipated that repeat injections will be required over the long term.    Again,  thank you for allowing me to participate in the care of your patient.      Sincerely,    Kameron Mcarthur MD   minimum assist (75% patients effort)

## 2025-02-18 NOTE — PROGRESS NOTES
Rodrigo Gann is a 64 year old male with a history of adductor laryngeal dystonia and laryngeal spasm.  he was last injected on 11/19/2024. he had a good response to the last injection. The last dose given was 0.75 units into each thyroarytenoid muscle.  After the injection  he had a breathy dysphonia for 0.5 weeks.There was no Dysphagia or Dyspnea.  The response lasted for 3 months.   Our plan is to give 0.75 units of Botulinum A toxin into  each thyroarytenoid muscle today.      PROCEDURE: After obtaining consent, the patient was placed in the supine position. Ground and reference electrodes were applied. The anterior neck was cleaned with an alcohol swab.  Using a 27-gauge, unipolar electromyography needle, the larynx was entered through the cricothyroid space.  0.75 units of botulinum A toxin was injected into each thyroarytenoid muscle.  There was a Moderate EMG response to phonation on the left side and a Moderate EMG response to phonation on the right side.  The total amount of botulinum A toxin delivered today was 1.5 units. An additional 98.5 units of botulinum A toxin was necessarily wasted in preparation for the injection. he tolerated the procedure well and left the clinic after a short observation period.         The EMG was necessary specifically in this case to identify areas of muscle overactivity as well as to access the thyroarytenoid muscle which is beneath the thyroid cartilage and is not otherwise directly accessible without EMG guidance.    PLAN: I will have him  follow up on a PRN basis. It is anticipated that repeat injections will be required over the long term.

## 2025-05-20 ENCOUNTER — OFFICE VISIT (OUTPATIENT)
Dept: OTOLARYNGOLOGY | Facility: CLINIC | Age: 65
End: 2025-05-20
Payer: COMMERCIAL

## 2025-05-20 DIAGNOSIS — J38.5 LARYNGEAL SPASM: Primary | ICD-10-CM

## 2025-05-20 DIAGNOSIS — J38.3 OTHER DISEASES OF VOCAL CORDS: ICD-10-CM

## 2025-05-20 PROCEDURE — 64617 CHEMODENER MUSCLE LARYNX EMG: CPT | Mod: 50 | Performed by: OTOLARYNGOLOGY

## 2025-05-20 PROCEDURE — 1126F AMNT PAIN NOTED NONE PRSNT: CPT | Performed by: OTOLARYNGOLOGY

## 2025-05-20 RX ORDER — ROSUVASTATIN CALCIUM 20 MG/1
1 TABLET, COATED ORAL AT BEDTIME
COMMUNITY
Start: 2019-01-01

## 2025-05-20 ASSESSMENT — PAIN SCALES - GENERAL: PAINLEVEL_OUTOF10: NO PAIN (0)

## 2025-05-20 NOTE — PATIENT INSTRUCTIONS
1.  You were seen in the ENT Clinic today by . If you have any questions or concerns after your appointment, please call [phone]. Press option #1 for scheduling related needs. Press option #3 for Nurse advice.    2.   has recommended the following:   - follow up with either  or  for next injection    3.  Plan is to return to clinic JOSÉ Domínguez LPN  [phone]  [facility] - Otolaryngology

## 2025-05-20 NOTE — PROGRESS NOTES
Rodirgo Gann is a 64 year old male with a history of adductor laryngeal dystonia and laryngeal spasm.  he was last injected on 12/18/2025. he had a good response to the last injection. The last dose given was 0.75 units into each thyroarytenoid muscle.  After the injection  he had a breathy dysphonia for 0 weeks.There was no Dysphagia or Dyspnea.  The response lasted for 3 months.   Our plan is to give 0.75 units of Botulinum A toxin into  each thyroarytenoid muscle today.      PROCEDURE: After obtaining consent, the patient was placed in the supine position. Ground and reference electrodes were applied. The anterior neck was cleaned with an alcohol swab.  Using a 27-gauge, unipolar electromyography needle, the larynx was entered through the cricothyroid space.  0.75 units of botulinum A toxin was injected into each thyroarytenoid muscle.  There was a Moderate EMG response to phonation on the left side and a Moderate EMG response to phonation on the right side.  The total amount of botulinum A toxin delivered today was 1.5 units. An additional 98.5 units of botulinum A toxin was necessarily wasted in preparation for the injection. he tolerated the procedure well and left the clinic after a short observation period.         The EMG was necessary specifically in this case to identify areas of muscle overactivity as well as to access the thyroarytenoid muscle which is beneath the thyroid cartilage and is not otherwise directly accessible without EMG guidance.    PLAN: I will have him  follow up on a PRN basis. It is anticipated that repeat injections will be required over the long term.

## 2025-05-20 NOTE — LETTER
5/20/2025       RE: Rodrigo Gann  [address] [address] [facility] [address]     Dear Colleague,    Thank you for referring your patient, Rodrigo Gann, to the [facility] EAR NOSE AND THROAT CLINIC [place] at [facility]. Please see a copy of my visit note below.    Rodrigo Gann is a 64 year old male with a history of adductor laryngeal dystonia and laryngeal spasm.  he was last injected on 12/18/2025. he had a good response to the last injection. The last dose given was 0.75 units into each thyroarytenoid muscle.  After the injection  he had a breathy dysphonia for 0 weeks.There was no Dysphagia or Dyspnea.  The response lasted for 3 months.   Our plan is to give 0.75 units of Botulinum A toxin into  each thyroarytenoid muscle today.      PROCEDURE: After obtaining consent, the patient was placed in the supine position. Ground and reference electrodes were applied. The anterior neck was cleaned with an alcohol swab.  Using a 27-gauge, unipolar electromyography needle, the larynx was entered through the cricothyroid space.  0.75 units of botulinum A toxin was injected into each thyroarytenoid muscle.  There was a Moderate EMG response to phonation on the left side and a Moderate EMG response to phonation on the right side.  The total amount of botulinum A toxin delivered today was 1.5 units. An additional 98.5 units of botulinum A toxin was necessarily wasted in preparation for the injection. he tolerated the procedure well and left the clinic after a short observation period.         The EMG was necessary specifically in this case to identify areas of muscle overactivity as well as to access the thyroarytenoid muscle which is beneath the thyroid cartilage and is not otherwise directly accessible without EMG guidance.    PLAN: I will have him  follow up on a PRN basis. It is anticipated that repeat injections will be required over the long term.      Again,  thank you for allowing me to participate in the care of your patient.      Sincerely,    Kameron Mcarthur MD

## 2025-05-21 ENCOUNTER — TELEPHONE (OUTPATIENT)
Dept: OTOLARYNGOLOGY | Facility: CLINIC | Age: 65
End: 2025-05-21
Payer: COMMERCIAL

## 2025-05-21 NOTE — TELEPHONE ENCOUNTER
Left vm message for patient in regards to scheduling next botox appt. Writer contact information provided on vm.

## 2025-08-18 ENCOUNTER — OFFICE VISIT (OUTPATIENT)
Dept: OTOLARYNGOLOGY | Facility: CLINIC | Age: 65
End: 2025-08-18
Payer: COMMERCIAL

## 2025-08-18 VITALS — WEIGHT: 150 LBS | HEIGHT: 68 IN | BODY MASS INDEX: 22.73 KG/M2

## 2025-08-18 DIAGNOSIS — J38.5 LARYNGEAL SPASM: ICD-10-CM

## 2025-08-18 DIAGNOSIS — R49.0 DYSPHONIA: ICD-10-CM

## 2025-08-18 DIAGNOSIS — G24.9 DYSTONIA: Primary | ICD-10-CM
